# Patient Record
Sex: FEMALE | Race: WHITE | Employment: OTHER | URBAN - METROPOLITAN AREA
[De-identification: names, ages, dates, MRNs, and addresses within clinical notes are randomized per-mention and may not be internally consistent; named-entity substitution may affect disease eponyms.]

---

## 2017-01-30 PROBLEM — Z95.2 AORTIC VALVE REPLACED: Status: ACTIVE | Noted: 2017-01-30

## 2017-01-30 PROBLEM — N18.4 TYPE 2 DIABETES MELLITUS WITH STAGE 4 CHRONIC KIDNEY DISEASE, WITHOUT LONG-TERM CURRENT USE OF INSULIN (HCC): Status: ACTIVE | Noted: 2017-01-30

## 2017-01-30 PROBLEM — E11.22 TYPE 2 DIABETES MELLITUS WITH STAGE 4 CHRONIC KIDNEY DISEASE, WITHOUT LONG-TERM CURRENT USE OF INSULIN (HCC): Status: ACTIVE | Noted: 2017-01-30

## 2017-01-30 PROBLEM — Z85.3 HX: BREAST CANCER: Status: ACTIVE | Noted: 2017-01-30

## 2017-02-02 PROBLEM — I12.9 RENAL HYPERTENSION, STAGE 1-4 OR UNSPECIFIED CHRONIC KIDNEY DISEASE: Status: ACTIVE | Noted: 2017-02-02

## 2017-03-09 PROBLEM — M48.02 CERVICAL SPINAL STENOSIS: Status: ACTIVE | Noted: 2017-03-09

## 2017-03-09 PROBLEM — R73.09 ABNORMAL GLUCOSE: Status: ACTIVE | Noted: 2017-03-09

## 2017-03-13 PROCEDURE — 81003 URINALYSIS AUTO W/O SCOPE: CPT | Performed by: INTERNAL MEDICINE

## 2017-03-14 ENCOUNTER — HOSPITAL ENCOUNTER (OUTPATIENT)
Dept: PHYSICAL THERAPY | Facility: HOSPITAL | Age: 82
Setting detail: THERAPIES SERIES
Discharge: HOME | End: 2017-03-14
Attending: ORTHOPAEDIC SURGERY
Payer: MEDICARE

## 2017-03-14 ENCOUNTER — APPOINTMENT (OUTPATIENT)
Dept: LAB | Facility: HOSPITAL | Age: 82
End: 2017-03-14
Attending: ORTHOPAEDIC SURGERY
Payer: MEDICARE

## 2017-03-14 DIAGNOSIS — M48.02 CERVICAL SPINAL STENOSIS: ICD-10-CM

## 2017-03-14 LAB
APTT PPP: 33.2 SECONDS (ref 25–34)
INR BLD: 1.02 (ref 0.89–1.11)
PSA SERPL DL<=0.01 NG/ML-MCNC: 13.4 SECONDS (ref 12–14.3)

## 2017-03-14 PROCEDURE — 85610 PROTHROMBIN TIME: CPT

## 2017-03-14 PROCEDURE — 87147 CULTURE TYPE IMMUNOLOGIC: CPT

## 2017-03-14 PROCEDURE — 36415 COLL VENOUS BLD VENIPUNCTURE: CPT

## 2017-03-14 PROCEDURE — 87081 CULTURE SCREEN ONLY: CPT

## 2017-03-14 PROCEDURE — 85730 THROMBOPLASTIN TIME PARTIAL: CPT

## 2017-03-20 ENCOUNTER — ANESTHESIA EVENT (OUTPATIENT)
Dept: SURGERY | Facility: HOSPITAL | Age: 82
End: 2017-03-20

## 2017-03-23 ENCOUNTER — ANESTHESIA (OUTPATIENT)
Dept: SURGERY | Facility: HOSPITAL | Age: 82
End: 2017-03-23

## 2017-04-03 ENCOUNTER — HOSPITAL ENCOUNTER (INPATIENT)
Facility: HOSPITAL | Age: 82
LOS: 2 days | Discharge: NURSING, REHAB OR LONG-TERM CARE | DRG: 472 | End: 2017-04-05
Attending: ORTHOPAEDIC SURGERY | Admitting: ORTHOPAEDIC SURGERY
Payer: MEDICARE

## 2017-04-03 ENCOUNTER — SURGERY (OUTPATIENT)
Age: 82
End: 2017-04-03

## 2017-04-03 ENCOUNTER — APPOINTMENT (OUTPATIENT)
Dept: GENERAL RADIOLOGY | Facility: HOSPITAL | Age: 82
DRG: 472 | End: 2017-04-03
Attending: ORTHOPAEDIC SURGERY
Payer: MEDICARE

## 2017-04-03 DIAGNOSIS — M48.02 CERVICAL SPINAL STENOSIS: Primary | ICD-10-CM

## 2017-04-03 DIAGNOSIS — M54.2 NECK PAIN: ICD-10-CM

## 2017-04-03 DIAGNOSIS — G95.9 CERVICAL MYELOPATHY (HCC): ICD-10-CM

## 2017-04-03 PROCEDURE — 00BT0ZZ EXCISION OF SPINAL MENINGES, OPEN APPROACH: ICD-10-PCS | Performed by: ORTHOPAEDIC SURGERY

## 2017-04-03 PROCEDURE — 0RG20A0 FUSION OF 2 OR MORE CERVICAL VERTEBRAL JOINTS WITH INTERBODY FUSION DEVICE, ANTERIOR APPROACH, ANTERIOR COLUMN, OPEN APPROACH: ICD-10-PCS | Performed by: ORTHOPAEDIC SURGERY

## 2017-04-03 PROCEDURE — 01N10ZZ RELEASE CERVICAL NERVE, OPEN APPROACH: ICD-10-PCS | Performed by: ORTHOPAEDIC SURGERY

## 2017-04-03 PROCEDURE — 95938 SOMATOSENSORY TESTING: CPT | Performed by: ORTHOPAEDIC SURGERY

## 2017-04-03 PROCEDURE — 4A11X4G MONITORING OF PERIPHERAL NERVOUS ELECTRICAL ACTIVITY, INTRAOPERATIVE, EXTERNAL APPROACH: ICD-10-PCS | Performed by: ORTHOPAEDIC SURGERY

## 2017-04-03 PROCEDURE — 88311 DECALCIFY TISSUE: CPT | Performed by: ORTHOPAEDIC SURGERY

## 2017-04-03 PROCEDURE — 88304 TISSUE EXAM BY PATHOLOGIST: CPT | Performed by: ORTHOPAEDIC SURGERY

## 2017-04-03 PROCEDURE — 85027 COMPLETE CBC AUTOMATED: CPT | Performed by: PHYSICIAN ASSISTANT

## 2017-04-03 PROCEDURE — 95940 IONM IN OPERATNG ROOM 15 MIN: CPT | Performed by: ORTHOPAEDIC SURGERY

## 2017-04-03 PROCEDURE — 95870 NDL EMG LMTD STD MUSC 1 XTR: CPT | Performed by: ORTHOPAEDIC SURGERY

## 2017-04-03 PROCEDURE — 95939 C MOTOR EVOKED UPR&LWR LIMBS: CPT | Performed by: ORTHOPAEDIC SURGERY

## 2017-04-03 PROCEDURE — 0RB30ZZ EXCISION OF CERVICAL VERTEBRAL DISC, OPEN APPROACH: ICD-10-PCS | Performed by: ORTHOPAEDIC SURGERY

## 2017-04-03 PROCEDURE — 76001 XR C-ARM FLUORO >1 HOUR  (CPT=76001): CPT

## 2017-04-03 DEVICE — FLOSEAL SEALENT STERILE 10ML: Type: IMPLANTABLE DEVICE | Site: NECK | Status: FUNCTIONAL

## 2017-04-03 DEVICE — OSTEOCEL PRO BONE MATRIX SM: Type: IMPLANTABLE DEVICE | Site: NECK | Status: FUNCTIONAL

## 2017-04-03 DEVICE — ARCHON PLATE 40MM 2-LEVEL: Type: IMPLANTABLE DEVICE | Site: NECK | Status: FUNCTIONAL

## 2017-04-03 RX ORDER — TIZANIDINE 2 MG/1
2 TABLET ORAL EVERY 8 HOURS PRN
Status: DISCONTINUED | OUTPATIENT
Start: 2017-04-03 | End: 2017-04-05

## 2017-04-03 RX ORDER — SENNOSIDES 8.6 MG
17.2 TABLET ORAL NIGHTLY
Status: DISCONTINUED | OUTPATIENT
Start: 2017-04-03 | End: 2017-04-05

## 2017-04-03 RX ORDER — ALLOPURINOL 300 MG/1
300 TABLET ORAL DAILY PRN
Status: DISCONTINUED | OUTPATIENT
Start: 2017-04-03 | End: 2017-04-05

## 2017-04-03 RX ORDER — FUROSEMIDE 40 MG/1
40 TABLET ORAL EVERY 12 HOURS
COMMUNITY
End: 2017-06-30

## 2017-04-03 RX ORDER — MEPERIDINE HYDROCHLORIDE 25 MG/ML
12.5 INJECTION INTRAMUSCULAR; INTRAVENOUS; SUBCUTANEOUS AS NEEDED
Status: DISCONTINUED | OUTPATIENT
Start: 2017-04-03 | End: 2017-04-03 | Stop reason: HOSPADM

## 2017-04-03 RX ORDER — TIZANIDINE 4 MG/1
4 TABLET ORAL EVERY 8 HOURS PRN
Status: ON HOLD | COMMUNITY
End: 2017-04-05

## 2017-04-03 RX ORDER — HYDROMORPHONE HYDROCHLORIDE 1 MG/ML
INJECTION, SOLUTION INTRAMUSCULAR; INTRAVENOUS; SUBCUTANEOUS
Status: COMPLETED
Start: 2017-04-03 | End: 2017-04-03

## 2017-04-03 RX ORDER — SODIUM CHLORIDE, SODIUM LACTATE, POTASSIUM CHLORIDE, CALCIUM CHLORIDE 600; 310; 30; 20 MG/100ML; MG/100ML; MG/100ML; MG/100ML
INJECTION, SOLUTION INTRAVENOUS CONTINUOUS
Status: DISCONTINUED | OUTPATIENT
Start: 2017-04-03 | End: 2017-04-05

## 2017-04-03 RX ORDER — HYDROMORPHONE HYDROCHLORIDE 1 MG/ML
0.4 INJECTION, SOLUTION INTRAMUSCULAR; INTRAVENOUS; SUBCUTANEOUS EVERY 30 MIN PRN
Status: DISCONTINUED | OUTPATIENT
Start: 2017-04-03 | End: 2017-04-05

## 2017-04-03 RX ORDER — ONDANSETRON 2 MG/ML
4 INJECTION INTRAMUSCULAR; INTRAVENOUS AS NEEDED
Status: DISCONTINUED | OUTPATIENT
Start: 2017-04-03 | End: 2017-04-03 | Stop reason: HOSPADM

## 2017-04-03 RX ORDER — FUROSEMIDE 40 MG/1
40 TABLET ORAL
Status: DISCONTINUED | OUTPATIENT
Start: 2017-04-03 | End: 2017-04-05

## 2017-04-03 RX ORDER — DIPHENHYDRAMINE HCL 25 MG
25 CAPSULE ORAL EVERY 4 HOURS PRN
Status: DISCONTINUED | OUTPATIENT
Start: 2017-04-03 | End: 2017-04-05

## 2017-04-03 RX ORDER — ATORVASTATIN CALCIUM 20 MG/1
20 TABLET, FILM COATED ORAL NIGHTLY
Status: DISCONTINUED | OUTPATIENT
Start: 2017-04-03 | End: 2017-04-05

## 2017-04-03 RX ORDER — HYDROCODONE BITARTRATE AND ACETAMINOPHEN 5; 325 MG/1; MG/1
2 TABLET ORAL EVERY 4 HOURS PRN
Status: DISCONTINUED | OUTPATIENT
Start: 2017-04-03 | End: 2017-04-04

## 2017-04-03 RX ORDER — SODIUM CHLORIDE 9 MG/ML
INJECTION, SOLUTION INTRAVENOUS CONTINUOUS
Status: DISCONTINUED | OUTPATIENT
Start: 2017-04-03 | End: 2017-04-05

## 2017-04-03 RX ORDER — ONDANSETRON 2 MG/ML
4 INJECTION INTRAMUSCULAR; INTRAVENOUS EVERY 4 HOURS PRN
Status: ACTIVE | OUTPATIENT
Start: 2017-04-03 | End: 2017-04-04

## 2017-04-03 RX ORDER — ACETAMINOPHEN 325 MG/1
650 TABLET ORAL EVERY 4 HOURS PRN
Status: DISCONTINUED | OUTPATIENT
Start: 2017-04-03 | End: 2017-04-05

## 2017-04-03 RX ORDER — DEXAMETHASONE SODIUM PHOSPHATE 4 MG/ML
8 VIAL (ML) INJECTION EVERY 8 HOURS
Status: DISCONTINUED | OUTPATIENT
Start: 2017-04-03 | End: 2017-04-03

## 2017-04-03 RX ORDER — MORPHINE SULFATE 4 MG/ML
4 INJECTION, SOLUTION INTRAMUSCULAR; INTRAVENOUS EVERY 2 HOUR PRN
Status: DISCONTINUED | OUTPATIENT
Start: 2017-04-03 | End: 2017-04-05

## 2017-04-03 RX ORDER — DOCUSATE SODIUM 100 MG/1
100 CAPSULE, LIQUID FILLED ORAL 2 TIMES DAILY
Status: DISCONTINUED | OUTPATIENT
Start: 2017-04-03 | End: 2017-04-05

## 2017-04-03 RX ORDER — POLYETHYLENE GLYCOL 3350 17 G/17G
17 POWDER, FOR SOLUTION ORAL DAILY PRN
Status: DISCONTINUED | OUTPATIENT
Start: 2017-04-03 | End: 2017-04-05

## 2017-04-03 RX ORDER — METOCLOPRAMIDE HYDROCHLORIDE 5 MG/ML
5 INJECTION INTRAMUSCULAR; INTRAVENOUS EVERY 6 HOURS PRN
Status: DISCONTINUED | OUTPATIENT
Start: 2017-04-03 | End: 2017-04-05

## 2017-04-03 RX ORDER — HYDROCODONE BITARTRATE AND ACETAMINOPHEN 5; 325 MG/1; MG/1
1 TABLET ORAL EVERY 4 HOURS PRN
Status: DISCONTINUED | OUTPATIENT
Start: 2017-04-03 | End: 2017-04-04

## 2017-04-03 RX ORDER — NALOXONE HYDROCHLORIDE 0.4 MG/ML
80 INJECTION, SOLUTION INTRAMUSCULAR; INTRAVENOUS; SUBCUTANEOUS AS NEEDED
Status: DISCONTINUED | OUTPATIENT
Start: 2017-04-03 | End: 2017-04-03 | Stop reason: HOSPADM

## 2017-04-03 RX ORDER — MIDAZOLAM HYDROCHLORIDE 1 MG/ML
INJECTION INTRAMUSCULAR; INTRAVENOUS
Status: COMPLETED
Start: 2017-04-03 | End: 2017-04-03

## 2017-04-03 RX ORDER — HYDROMORPHONE HYDROCHLORIDE 1 MG/ML
0.4 INJECTION, SOLUTION INTRAMUSCULAR; INTRAVENOUS; SUBCUTANEOUS EVERY 5 MIN PRN
Status: DISCONTINUED | OUTPATIENT
Start: 2017-04-03 | End: 2017-04-03 | Stop reason: HOSPADM

## 2017-04-03 RX ORDER — LEVOTHYROXINE SODIUM 0.07 MG/1
75 TABLET ORAL
Status: DISCONTINUED | OUTPATIENT
Start: 2017-04-04 | End: 2017-04-05

## 2017-04-03 RX ORDER — DIPHENHYDRAMINE HYDROCHLORIDE 50 MG/ML
12.5 INJECTION INTRAMUSCULAR; INTRAVENOUS AS NEEDED
Status: DISCONTINUED | OUTPATIENT
Start: 2017-04-03 | End: 2017-04-03 | Stop reason: HOSPADM

## 2017-04-03 RX ORDER — MORPHINE SULFATE 2 MG/ML
2 INJECTION, SOLUTION INTRAMUSCULAR; INTRAVENOUS EVERY 2 HOUR PRN
Status: DISCONTINUED | OUTPATIENT
Start: 2017-04-03 | End: 2017-04-05

## 2017-04-03 RX ORDER — MORPHINE SULFATE 2 MG/ML
1 INJECTION, SOLUTION INTRAMUSCULAR; INTRAVENOUS EVERY 2 HOUR PRN
Status: DISCONTINUED | OUTPATIENT
Start: 2017-04-03 | End: 2017-04-05

## 2017-04-03 RX ORDER — NALOXONE HYDROCHLORIDE 0.4 MG/ML
0.08 INJECTION, SOLUTION INTRAMUSCULAR; INTRAVENOUS; SUBCUTANEOUS
Status: DISCONTINUED | OUTPATIENT
Start: 2017-04-03 | End: 2017-04-05

## 2017-04-03 RX ORDER — DIPHENHYDRAMINE HYDROCHLORIDE 50 MG/ML
25 INJECTION INTRAMUSCULAR; INTRAVENOUS EVERY 4 HOURS PRN
Status: DISCONTINUED | OUTPATIENT
Start: 2017-04-03 | End: 2017-04-05

## 2017-04-03 RX ORDER — DIPHENHYDRAMINE HYDROCHLORIDE 50 MG/ML
12.5 INJECTION INTRAMUSCULAR; INTRAVENOUS EVERY 4 HOURS PRN
Status: DISCONTINUED | OUTPATIENT
Start: 2017-04-03 | End: 2017-04-05

## 2017-04-03 RX ORDER — ALLOPURINOL 300 MG/1
300 TABLET ORAL AS NEEDED
Status: ON HOLD | COMMUNITY
End: 2017-04-05

## 2017-04-03 RX ORDER — NALBUPHINE HCL 10 MG/ML
2.5 AMPUL (ML) INJECTION EVERY 4 HOURS PRN
Status: DISCONTINUED | OUTPATIENT
Start: 2017-04-03 | End: 2017-04-05

## 2017-04-03 RX ORDER — MEPERIDINE HYDROCHLORIDE 25 MG/ML
INJECTION INTRAMUSCULAR; INTRAVENOUS; SUBCUTANEOUS
Status: DISCONTINUED
Start: 2017-04-03 | End: 2017-04-03 | Stop reason: WASHOUT

## 2017-04-03 RX ORDER — ONDANSETRON 2 MG/ML
4 INJECTION INTRAMUSCULAR; INTRAVENOUS EVERY 6 HOURS PRN
Status: DISCONTINUED | OUTPATIENT
Start: 2017-04-04 | End: 2017-04-05

## 2017-04-03 RX ORDER — MIDAZOLAM HYDROCHLORIDE 1 MG/ML
1 INJECTION INTRAMUSCULAR; INTRAVENOUS EVERY 5 MIN PRN
Status: DISCONTINUED | OUTPATIENT
Start: 2017-04-03 | End: 2017-04-03 | Stop reason: HOSPADM

## 2017-04-03 RX ORDER — BACITRACIN 50000 [USP'U]/1
INJECTION, POWDER, LYOPHILIZED, FOR SOLUTION INTRAMUSCULAR AS NEEDED
Status: DISCONTINUED | OUTPATIENT
Start: 2017-04-03 | End: 2017-04-03 | Stop reason: HOSPADM

## 2017-04-03 RX ORDER — BISACODYL 10 MG
10 SUPPOSITORY, RECTAL RECTAL
Status: DISCONTINUED | OUTPATIENT
Start: 2017-04-03 | End: 2017-04-05

## 2017-04-03 RX ORDER — SODIUM PHOSPHATE, DIBASIC AND SODIUM PHOSPHATE, MONOBASIC 7; 19 G/133ML; G/133ML
1 ENEMA RECTAL ONCE AS NEEDED
Status: ACTIVE | OUTPATIENT
Start: 2017-04-03 | End: 2017-04-03

## 2017-04-03 RX ORDER — DEXAMETHASONE SODIUM PHOSPHATE 4 MG/ML
8 VIAL (ML) INJECTION EVERY 8 HOURS
Status: COMPLETED | OUTPATIENT
Start: 2017-04-03 | End: 2017-04-04

## 2017-04-03 NOTE — ADDENDUM NOTE
Addendum  created 04/03/17 1601 by Debi Alfaro MD    Modules edited: Clinical Notes    Clinical Notes:  File: [id number]

## 2017-04-03 NOTE — H&P
Bronsonjosselyn Yusuf is here for preoperative consultation through Dr. Latricia Dickinson She has cervical spinal stenosis.  She was found to be positive for MRSA, and she reports that Dr. Jamal Guzman staff mentions 3 consecutive tests.  I inquired of her was 3 areas at one time and sh spine–Surgery is planned  -     E4 ESTAB IV [id number]    MRSA (methicillin resistant staph aureus) culture positive–Will check with surgical office to see the procedure–They will proceed based upon current treatment and retest following surgery  -     E4 ESTAB deficiency     Colon cancer screening     Calcific tendinitis of right shoulder     Primary osteoarthritis of right shoulder     Mild intermittent asthma without complication     Primary osteoarthritis of first carpometacarpal joint of left hand     Parest

## 2017-04-03 NOTE — PROGRESS NOTES
S:   Denies difficulty breathing or swallowing  She denies numbness no arm pain    Inspection: Awake Alert  No acute distress. Blood pressure 165/83, pulse 81, temperature 98.3 °F (36.8 °C), temperature source Temporal, resp.  rate 14, height 5' 7\" (1.

## 2017-04-03 NOTE — PROGRESS NOTES
[facility] Pharmacy Note:  Renal Adjustment for Ancef (cefazolin)    Terrell Carrillo is a 80year old female who has been prescribed Ancef (cefazolin) 2 g IVPB every 8 hrs x 2 doses.   CrCl is 29.8 (using SCr of 1.44 from 3/9) so the dose has been adjusted to Anc

## 2017-04-03 NOTE — ANESTHESIA PREPROCEDURE EVALUATION
PRE-OP EVALUATION    Patient Name: Gabriel Mcpherson    Pre-op Diagnosis: CERVICAL 3 - CERVICAL 4, CERVICAL 4 - CERVICAL 5 STENOSIS, KYPHOSIS MYELOPAHTY    Procedure(s):  CERVICAL 3 - CERVICAL 4, CERVICAL 4 - CERVICAL 5 ANTERIOR CERVICAL DISCECTOMY FUSION Cardiovascular      ECG reviewed.             (+) hypertension   (+) hyperlipidemia  (+) CAD    (+) CABG/stent         (+) dysrhythmias and atrial fibrillation                  Endo/Other           (+) hypothyroidism                       Pulmonary      ( EPIDURAL;  Surgeon: Leeroy Bolaños MD;  Location: [facility] FOR PAIN MANAGEMENT    INJECTION, W/WO CONTRAST, DX/THERAPEUTIC SUBSTANCE, EPIDURAL/SUBARACHNOID; LUMBAR/SACRAL N/A 8/13/2014    Comment Procedure: LUMBAR EPIDURAL;  Surgeon: Leeroy Bolaños MD; pain management plan discussed with surgeon and patient. Plan/risks discussed with: patient                Options, risks and benefits of anesthesia as outlined in the anesthesia consent were reviewed with the patient.  The consent was signed without f

## 2017-04-03 NOTE — ANESTHESIA POSTPROCEDURE EVALUATION
[facility] Patient Status:  Surgery Admit   Age/Gender 80year old female MRN [id number]   [facility] SURGERY Attending Rachel Arizmendi MD   Hosp Day # 0 PCP Violeta Sparrow MD       Anesthesia Post-op Note    Proce

## 2017-04-03 NOTE — PROGRESS NOTES
Patient arrived on unit from PACU s/p ACDF C3-5 with Dr Mark Garcia. Patient drowsy, uncomfortable on arrival. Re-educated patient on PCA use, demonstrates understanding. Pain decreased from 10-6 when reassessed.    Weakness to bilateral arms, L>R when assessed, d

## 2017-04-03 NOTE — PROGRESS NOTES
[facility] Pharmacy Note:  Renal Dose Adjustment for Metoclopramide (REGLAN)    Sheba Hoang has been prescribed Metoclopramide (REGLAN) 10 mg every 6 hours as needed for nausea/vomiting.     CrCl is estimated to be 29.8 mL/min (using SCr of 1.44 from 3/9/2017

## 2017-04-03 NOTE — BRIEF OP NOTE
[address] [facility]  Brief Op Note     Terrell Carrillo Location: OR   [facility] [id number] MRN [id number]   Admission Date 4/3/2017 Operation Date 4/3/2017   Attending Physician Kory Haley MD Operating Physician SUMMER Ellis       Pre-Operative Lynette

## 2017-04-04 PROCEDURE — 97530 THERAPEUTIC ACTIVITIES: CPT

## 2017-04-04 PROCEDURE — 97165 OT EVAL LOW COMPLEX 30 MIN: CPT

## 2017-04-04 PROCEDURE — 97535 SELF CARE MNGMENT TRAINING: CPT

## 2017-04-04 PROCEDURE — 97162 PT EVAL MOD COMPLEX 30 MIN: CPT

## 2017-04-04 PROCEDURE — 85027 COMPLETE CBC AUTOMATED: CPT | Performed by: PHYSICIAN ASSISTANT

## 2017-04-04 RX ORDER — ACETAMINOPHEN AND CODEINE PHOSPHATE 300; 30 MG/1; MG/1
1 TABLET ORAL EVERY 4 HOURS PRN
Status: DISCONTINUED | OUTPATIENT
Start: 2017-04-04 | End: 2017-04-05

## 2017-04-04 RX ORDER — TRAMADOL HYDROCHLORIDE 50 MG/1
50 TABLET ORAL EVERY 6 HOURS PRN
Status: DISCONTINUED | OUTPATIENT
Start: 2017-04-04 | End: 2017-04-05

## 2017-04-04 NOTE — PAYOR COMM NOTE
Komal Amin     (MR # [id number])         Order    Admit to inpatient Once [ADT1] (Order [id number])         Order Questions      Question Answer Comment     Admitting Physician Maral William [facility]      Diagnosis Cervical spinal stenosis      Level of Care A

## 2017-04-04 NOTE — PHYSICAL THERAPY NOTE
PHYSICAL THERAPY EVALUATION - INPATIENT     Room Number: 375/375-A  Evaluation Date: 4/4/2017  Type of Evaluation: Initial  Physician Order: PT Eval and Treat    Presenting Problem: S/p C3-C4,C4-C5,ACDF on 04/03/17  Reason for Therapy: Mobility Dysfunc DX/THERAPEUTIC SUBSTANCE, EPIDURAL/SUBARACHNOID; LUMBAR/SACRAL N/A 6/6/2014    Comment Procedure: LUMBAR EPIDURAL;  Surgeon: Callum Joshi MD;  Location: [address] & Sybil Scott NDL/CATH SPI DX/THER Thomas Misha Kapil 84 N/A 6/6/2014    Commen past 2-3 months patient has been using RW & w/c when in community.  Has been using adaptive equipments for self care & has modified several ADLs & self care styles to be able to perform with her weaker left UE & LE.Babysits 16 months twins of grand daughter the patient currently need. ..   -   Moving to and from a bed to a chair (including a wheelchair)?: A Little   -   Need to walk in hospital room?: A Little   -   Climbing 3-5 steps with a railing?: Total       AM-PAC Score:  Raw Score: 16   PT Approx Degree re-educate;Stair training;Transfer training;Balance training  Rehab Potential : Good  Frequency (Obs): Daily  Number of Visits to Meet Established Goals: 5      CURRENT GOALS    Goal #1 Patient is able to demonstrate supine - sit EOB @ level: supervision

## 2017-04-04 NOTE — PLAN OF CARE
PAIN - ADULT    • Verbalizes/displays adequate comfort level or patient's stated pain goal Progressing        Patient/Family Goals    • Patient/Family Short Term Goal Progressing        SAFETY ADULT - FALL    • Free from fall injury Progressing        Laisha

## 2017-04-04 NOTE — CM/SW NOTE
04/04/17 1432   CM/SW Referral Data   Referral Source Physician   Reason for Referral Discharge planning   Informant Patient;Spouse  (granddaughter)   Pertinent Medical Hx   Primary Care Physician Name Dr Fadi Tavarez   Patient Info   Advanced directives?  Chantelle Al

## 2017-04-04 NOTE — PROGRESS NOTES
Pt was seen this afternoon and fitted with a [place] J cervical collar. The brace was needed for stabilization post-op and to help with any muscle strain.

## 2017-04-04 NOTE — PROGRESS NOTES
POD #1 spine newsletter and swallowing precautions provided to patient. Reviewed indications, side effects of pain medication/narcotics and constipation prevention.  Stressed importance of increased fluids/roughage in diet, continued use stool softeners jessica

## 2017-04-04 NOTE — CONSULTS
.  Reason for consult: periop mgmt    Consulted by: Dr. Rhett Gregory    PCP: Pablo Looney MD      History of Present Illness: Patient is a 80year old female with PMH sig for asthma, HTN, PAF, hypothyroidism who presented for ACDF for cervical stenosis with LUMBAR EPIDURAL;  Surgeon: Lennice Felty, MD;  Location: [address] NDL/CATH SPI DX/THER Thomas Misha Kapil 84 N/A 6/6/2014    Comment Procedure: LUMBAR EPIDURAL;  Surgeon: Lennice Felty, MD;  Location: [address] [place] [address] Disp: 1 Tube Rfl: 0   Acetaminophen (TYLENOL ARTHRITIS PAIN OR) Take by mouth as needed. Disp:  Rfl:    metoprolol Tartrate 25 MG Oral Tab Take 1 tablet (25 mg total) by mouth 2 (two) times daily.  Disp: 60 tablet Rfl: 5   Atorvastatin Calcium 20 MG Oral Ta Use: Yes    Comment: very rare        Fam Hx  Family History   Problem Relation Age of Onset   • Cancer Father      lung cancer       Review of Systems  Comprehensive ROS reviewed and negative except for what's stated above.   Including negative for chest p asthma, uncomplicated COMPARISON:  None. TECHNIQUE: PA and left lateral views of the chest. FINDINGS:  Heart size and pulmonary vascularity are normal. There are sternotomy sutures and mediastinal clips present with prosthetic heart valve.  There is elongat meds, had discussion with pt/granddaughter and will try ultram and/or tylenol #3 instead of norco to see how she does with this. · Pt/ot  · Bowel regimen  · On IV steroids    # CAD s/p CABG, severe AS s/p bio AVR    # PAF- post-op after cardiac surgery.  O

## 2017-04-04 NOTE — PROGRESS NOTES
S:   Denies difficulty breathing or swallowing  She does not have pure numbness but a paresthesia in her left hand. She is still with weakness in the UE. She needed full assist with getting OOB yesterday. Inspection: Awake Alert  No acute distress.

## 2017-04-04 NOTE — OCCUPATIONAL THERAPY NOTE
OCCUPATIONAL THERAPY EVALUATION - INPATIENT     Room Number: 375/375-A  Evaluation Date: 4/4/2017  Type of Evaluation: Initial  Presenting Problem: s/p C3-5 ACDF 4/3/17    Physician Order: Coty Sullivan Consult to Occupational Therapy  Reason for Therapy: ADL/IADL Dy Comment Procedure: HIP INJECTION (PAIN);   Surgeon: Viky Calderon MD;  Location: [facility] FOR PAIN MANAGEMENT    INJECTION, W/WO CONTRAST, DX/THERAPEUTIC SUBSTANCE, EPIDURAL/SUBARACHNOID; LUMBAR/SACRAL N/A 6/6/2014    Comment Procedure: LUMBAR EPIDURAL; None    Occupation/Status: Retired   Hand Dominance: Right  Drives: Yes  Patient Regularly Uses: Glasses    Prior Level of [place]: Patient reports until August 2016 she was completely independent; then she began using a cane for functiona shortness of breath    ACTIVITIES OF DAILY LIVING ASSESSMENT  AM-PAC ‘6-Clicks’ Inpatient Daily Activity Short Form  How much help from another person does the patient currently need…  -   Putting on and taking off regular lower body clothing?: A Lot  - session/findings; All patient questions and concerns addressed;SCDs in place    ASSESSMENT   Patient is a 80year old  female admitted 4/3/2017 for C3-C5 ACDF.  In this OT evaluation patient presents with the following impairments: increased pain, decreased

## 2017-04-04 NOTE — PAYOR COMM NOTE
Attending Physician: Eduard Miller MD    Review Type: ADMISSION   Reviewer: Radha Garcia       Date: April 4, 2017 - 10:21 AM  Payor: [address] [place] [address] Number: [id number]  Admit date: 4/3/2017  9:01 AM   Admitted from [facility] MG/ML injection 8 mg     Date Action Dose Route User    4/4/2017 0444 Given 8 mg Intravenous Zenon Riley, RN    4/3/2017 2010 Given 8 mg Intravenous Zenon Osvaldo, RN      furosemide (LASIX) tab 40 mg     Date Action Dose Route User    4/4/2017 8668 (none) Intravenous Riana Hicks, RN      TiZANidine HCl (ZANAFLEX) tab 2 mg     Date Action Dose Route User    4/3/2017 1851 Given 2 mg Oral Sari Rodriguez RN        RESULTS LAST 24HRS:  Labs Reviewed   CBC, PLATELET; NO DIFFERENTIAL - Abnormal; Nota

## 2017-04-05 VITALS
DIASTOLIC BLOOD PRESSURE: 62 MMHG | OXYGEN SATURATION: 96 % | HEART RATE: 80 BPM | SYSTOLIC BLOOD PRESSURE: 133 MMHG | WEIGHT: 171.94 LBS | HEIGHT: 67 IN | TEMPERATURE: 98 F | RESPIRATION RATE: 18 BRPM | BODY MASS INDEX: 26.99 KG/M2

## 2017-04-05 PROCEDURE — 97116 GAIT TRAINING THERAPY: CPT

## 2017-04-05 PROCEDURE — 97530 THERAPEUTIC ACTIVITIES: CPT

## 2017-04-05 PROCEDURE — 97535 SELF CARE MNGMENT TRAINING: CPT

## 2017-04-05 RX ORDER — ACETAMINOPHEN AND CODEINE PHOSPHATE 300; 30 MG/1; MG/1
1 TABLET ORAL EVERY 4 HOURS PRN
Qty: 60 TABLET | Refills: 0 | Status: SHIPPED | OUTPATIENT
Start: 2017-04-05 | End: 2017-06-30

## 2017-04-05 RX ORDER — TRAMADOL HYDROCHLORIDE 50 MG/1
50 TABLET ORAL EVERY 8 HOURS PRN
Qty: 40 TABLET | Refills: 0 | Status: SHIPPED | OUTPATIENT
Start: 2017-04-05 | End: 2017-09-07

## 2017-04-05 RX ORDER — TIZANIDINE 4 MG/1
4 TABLET ORAL EVERY 8 HOURS PRN
Qty: 30 TABLET | Refills: 0 | Status: SHIPPED | OUTPATIENT
Start: 2017-04-05 | End: 2017-06-30

## 2017-04-05 NOTE — PROGRESS NOTES
Discharge education reviewed at bedside with patient. Teach back done.  Questions solicited and answered

## 2017-04-05 NOTE — PAYOR COMM NOTE
Attending Physician: Chacha Vickers MD    Review Type: CONTINUED STAY  [facility]     Date: April 5, 2017 - 10:51 AM  Payor: [address] [place] [address] Number: [id number]  Admit date: 4/3/2017  9:01 AM        REVIEWER COM User    4/5/2017 0934 Given 1 tablet Oral Radha Grace RN    4/4/2017 8904 Given 1 tablet Oral Camilla Jon RN    4/4/2017 4912 Given 1 tablet Oral Herlinda Aguila RN      Atorvastatin Calcium (LIPITOR) tab 20 mg     Date Action Dose Route User

## 2017-04-05 NOTE — PROGRESS NOTES
[facility]  Hospitalist note    PCP: Shai Swan MD    Chief Complaint:  79 yo woman with ACDF    SUBJECTIVE:  Feels that throat may be more swollen today. However still able to eat. Voice stronger. No sob. Took ultram overnight which did not help.  Too 1047   • lactated ringers     • sodium chloride 83 mL/hr at 04/04/17 0149     Acetaminophen-Codeine #3, TraMADol HCl, HYDROmorphone HCl PF, diphenhydrAMINE **OR** DiphenhydrAMINE HCl, PEG 3350, magnesium hydroxide, bisacodyl, ondansetron HCl, Metoclopramid

## 2017-04-05 NOTE — PROGRESS NOTES
NURSING DISCHARGE NOTE    Discharged via ambulance  Accompanied by family  Belongings sent with the patient  A copy of discharge paper was given to the patient, all questions were answered, all needs were attended to.  Safety prec in place

## 2017-04-05 NOTE — CM/SW NOTE
Informed by RN during d/c rounds that pt is expected to d/c to [facility] today. TERESSA spoke with Thu at [facility] [phone]. Pt is accepted today, facility will have [facility] Medicare auth today. Discussed 4PM d/c time.     [facility] accepted trans

## 2017-04-05 NOTE — OCCUPATIONAL THERAPY NOTE
OCCUPATIONAL THERAPY TREATMENT NOTE - INPATIENT     Room Number: 375/375-A  Session: 1   Number of Visits to Meet Established Goals: 5    Presenting Problem: s/p C3-5 ACDF 4/3/17    History related to current admission: Patient with history of cervical spi MANAGEMENT    INJECTION, W/WO CONTRAST, DX/THERAPEUTIC SUBSTANCE, EPIDURAL/SUBARACHNOID; LUMBAR/SACRAL N/A 6/6/2014    Comment Procedure: LUMBAR EPIDURAL;  Surgeon: Sara White MD;  Location: [address] [place] [address] breath    ACTIVITIES OF DAILY LIVING ASSESSMENT  AM-PAC ‘6-Clicks’ Inpatient Daily Activity Short Form  How much help from another person does the patient currently need…  -   Putting on and taking off regular lower body clothing?: A Little  -   Bathing (i patient questions and concerns addressed    ASSESSMENT   Patient seen for OT services this pm. In this session patient making good progress towards OT goals with improvements in balance, endurance and independence in ADL tasks, however patient remains belo

## 2017-04-05 NOTE — PHYSICAL THERAPY NOTE
PHYSICAL THERAPY TREATMENT NOTE - INPATIENT    Room Number: 375/375-A     Session: 1   Number of Visits to Meet Established Goals: 5    Presenting Problem: S/p C3-C4,C4-C5,ACDF on 04/03/17    Problem List  Active Problems:    Cervical spinal stenosis Ash Lovelace NDL/CATH SPI DX/THER NJX N/A 6/6/2014    Comment Procedure: LUMBAR EPIDURAL;  Surgeon: Nigel Ortiz MD;  Location: [facility] FOR PAIN MANAGEMENT    INJECTION, W/WO CONTRAST, DX/THERAPEUTIC SUBSTANCE, EPIDURAL/SUBARACHNOID; LUMBAR/SACRAL N/A 7/1/ Fair           Static Standing: Poor +  Dynamic Standing: Poor +    ACTIVITY TOLERANCE  O2 Saturation: 94%  Room air    AM-PAC '6-Clicks' INPATIENT SHORT FORM - BASIC MOBILITY  How much difficulty does the patient currently have. ..  -   Turning over in bed increased difficulty maintaining  on RW. Pt returned to room - Pt performed seated ankle pumps x 20 reps for improved circulation. Pt with call light in reach. RN informed of session. Mobility Guidelines updated in Pt room for nursing staff.

## 2017-04-05 NOTE — PLAN OF CARE
DISCHARGE PLANNING    • Discharge to home or other facility with appropriate resources Progressing        Impaired Activities of Daily Living    • Achieve highest/safest level of independence in self care Progressing        PAIN - ADULT    • Verbalizes/dis

## 2017-04-05 NOTE — PROGRESS NOTES
S:   Denies difficulty breathing or swallowing  + numbness with paresthesia to the left hand. She has weakness to bilateral arms. She was not able to tolerate the brace, and would like to use the soft collar.   She states the Tramadol gives no relief, and

## 2017-04-05 NOTE — PROGRESS NOTES
1429 PM: Report called in to [facility]. Will be sending Rx for Tylenol 3, Zanaflex and Tramadol. Patient requested for extra soft cervical collar which RN ordered from CD and was given to the patient. Patient requested for her medical records.  Pa

## 2017-04-06 NOTE — CM/SW NOTE
04/06/17 0700   Discharge disposition   Discharged to: Skilled Nurs   Name of Facillity/Home [facility] [address]   Discharge transportation [facility] [birth year]

## 2017-04-07 NOTE — DISCHARGE SUMMARY
[facility]  Discharge Summary    [facility] Patient Status:  Inpatient    1935 MRN [id number]   [facility] 3SW-A Attending No att. providers found   Hosp Day # 2 PCP Denice Pepper MD     Date of Admission: 4/3/2017    Da & INST 2 LEVEL Shahbaz Lovelace MD [facility] MAIN OR Comp        Afterward, pt was brought to the PACU in stable condition. After the recovery room the patient was transferred to the floor using standard protocol orders.   Once on the floor the patient was followe Tablet 24 Hr  Take 1 tablet by mouth daily. , Normal, Disp-90 tablet, R-3      STOP taking these medications    allopurinol 300 MG Oral Tab    HYDROcodone-acetaminophen  MG Oral Tab    Acetaminophen (TYLENOL ARTHRITIS PAIN OR)    aspirin (ASPIRIN LOW

## 2017-04-24 NOTE — OPERATIVE REPORT
[facility]    PATIENT'S NAME: Deandre Zuniga   ATTENDING PHYSICIAN: Bird Hui M.D. OPERATING PHYSICIAN: Bird Hiu M.D.    PATIENT ACCOUNT#:   [de-identified]    LOCATION:  [address] [place] [address]  MEDICAL RECORD #:   [id number]       DATE OF BIRTH:  1 Fluoroscopy was wheeled in to [place] an oblique incision at the medial border of the left sternocleidomastoid muscle. The neck was sterilely prepped and draped. An incision was made with a #15 blade. Bovie electrocautery was used for hemostasis.   Francia Lucas undertaken. Undercutting technique was used to decompress behind the vertebral bodies. Endplates were prepared. Allograft cage was sized. A PEEK interbody cage was sized, allograft was placed inside it, and it was impacted into position.   Yaneth casper monitored. Motor-evoked potentials remained entirely intact when taken at regular intervals. A drain was applied and sewn in with 2-0 nylon. Fascia was reapproximated with figure-of-eight 2-0 Vicryl. A running 3-0 subcuticular stitch was applied.   Ster

## 2017-05-09 ENCOUNTER — APPOINTMENT (OUTPATIENT)
Dept: MRI IMAGING | Facility: HOSPITAL | Age: 82
DRG: 552 | End: 2017-05-09
Attending: PHYSICIAN ASSISTANT
Payer: MEDICARE

## 2017-05-09 ENCOUNTER — HOSPITAL ENCOUNTER (INPATIENT)
Facility: HOSPITAL | Age: 82
LOS: 2 days | Discharge: HOME | DRG: 552 | End: 2017-05-11
Attending: HOSPITALIST | Admitting: HOSPITALIST
Payer: MEDICARE

## 2017-05-09 DIAGNOSIS — M41.86 OTHER FORM OF SCOLIOSIS OF LUMBAR SPINE: Primary | ICD-10-CM

## 2017-05-09 DIAGNOSIS — M48.062 SPINAL STENOSIS, LUMBAR REGION, WITH NEUROGENIC CLAUDICATION: ICD-10-CM

## 2017-05-09 DIAGNOSIS — M21.371 RIGHT FOOT DROP: ICD-10-CM

## 2017-05-09 PROCEDURE — 87081 CULTURE SCREEN ONLY: CPT | Performed by: HOSPITALIST

## 2017-05-09 PROCEDURE — 85025 COMPLETE CBC W/AUTO DIFF WBC: CPT | Performed by: HOSPITALIST

## 2017-05-09 PROCEDURE — 80048 BASIC METABOLIC PNL TOTAL CA: CPT | Performed by: HOSPITALIST

## 2017-05-09 PROCEDURE — 85610 PROTHROMBIN TIME: CPT | Performed by: HOSPITALIST

## 2017-05-09 RX ORDER — TIZANIDINE 2 MG/1
4 TABLET ORAL EVERY 8 HOURS PRN
Status: DISCONTINUED | OUTPATIENT
Start: 2017-05-09 | End: 2017-05-11

## 2017-05-09 RX ORDER — LEVOTHYROXINE SODIUM 0.07 MG/1
75 TABLET ORAL
Status: DISCONTINUED | OUTPATIENT
Start: 2017-05-10 | End: 2017-05-11

## 2017-05-09 RX ORDER — ONDANSETRON 2 MG/ML
4 INJECTION INTRAMUSCULAR; INTRAVENOUS EVERY 6 HOURS PRN
Status: DISCONTINUED | OUTPATIENT
Start: 2017-05-09 | End: 2017-05-11

## 2017-05-09 RX ORDER — ATORVASTATIN CALCIUM 20 MG/1
20 TABLET, FILM COATED ORAL DAILY
Status: DISCONTINUED | OUTPATIENT
Start: 2017-05-10 | End: 2017-05-11

## 2017-05-09 RX ORDER — TRAMADOL HYDROCHLORIDE 50 MG/1
50 TABLET ORAL EVERY 8 HOURS PRN
Status: DISCONTINUED | OUTPATIENT
Start: 2017-05-09 | End: 2017-05-11

## 2017-05-09 RX ORDER — ACETAMINOPHEN AND CODEINE PHOSPHATE 300; 30 MG/1; MG/1
1-2 TABLET ORAL EVERY 4 HOURS PRN
Status: DISCONTINUED | OUTPATIENT
Start: 2017-05-09 | End: 2017-05-09 | Stop reason: SDUPTHER

## 2017-05-09 RX ORDER — ACETAMINOPHEN AND CODEINE PHOSPHATE 300; 30 MG/1; MG/1
2 TABLET ORAL EVERY 4 HOURS PRN
Status: DISCONTINUED | OUTPATIENT
Start: 2017-05-09 | End: 2017-05-11

## 2017-05-09 RX ORDER — FUROSEMIDE 40 MG/1
40 TABLET ORAL EVERY 12 HOURS
Status: DISCONTINUED | OUTPATIENT
Start: 2017-05-09 | End: 2017-05-11

## 2017-05-09 RX ORDER — ACETAMINOPHEN AND CODEINE PHOSPHATE 300; 30 MG/1; MG/1
1 TABLET ORAL EVERY 4 HOURS PRN
Status: DISCONTINUED | OUTPATIENT
Start: 2017-05-09 | End: 2017-05-11

## 2017-05-09 RX ORDER — DEXAMETHASONE SODIUM PHOSPHATE 4 MG/ML
10 VIAL (ML) INJECTION EVERY 8 HOURS
Status: COMPLETED | OUTPATIENT
Start: 2017-05-09 | End: 2017-05-10

## 2017-05-09 RX ORDER — POTASSIUM CHLORIDE 20 MEQ/1
40 TABLET, EXTENDED RELEASE ORAL EVERY 4 HOURS
Status: COMPLETED | OUTPATIENT
Start: 2017-05-09 | End: 2017-05-10

## 2017-05-09 NOTE — PROGRESS NOTES
NURSING ADMISSION NOTE      Patient admitted via Wheelchair  Oriented to room. Safety precautions initiated. Bed in low position. Call light in reach. Admission navigator completed.

## 2017-05-10 ENCOUNTER — ANESTHESIA (OUTPATIENT)
Dept: PERIOP | Facility: HOSPITAL | Age: 82
DRG: 552 | End: 2017-05-10
Payer: MEDICARE

## 2017-05-10 ENCOUNTER — ANESTHESIA EVENT (OUTPATIENT)
Dept: PERIOP | Facility: HOSPITAL | Age: 82
DRG: 552 | End: 2017-05-10
Payer: MEDICARE

## 2017-05-10 ENCOUNTER — APPOINTMENT (OUTPATIENT)
Dept: MRI IMAGING | Facility: HOSPITAL | Age: 82
DRG: 552 | End: 2017-05-10
Attending: PHYSICIAN ASSISTANT
Payer: MEDICARE

## 2017-05-10 PROCEDURE — 85025 COMPLETE CBC W/AUTO DIFF WBC: CPT | Performed by: HOSPITALIST

## 2017-05-10 PROCEDURE — A9575 INJ GADOTERATE MEGLUMI 0.1ML: HCPCS

## 2017-05-10 PROCEDURE — 87081 CULTURE SCREEN ONLY: CPT | Performed by: HOSPITALIST

## 2017-05-10 PROCEDURE — 72156 MRI NECK SPINE W/O & W/DYE: CPT | Performed by: PHYSICIAN ASSISTANT

## 2017-05-10 PROCEDURE — 72158 MRI LUMBAR SPINE W/O & W/DYE: CPT | Performed by: PHYSICIAN ASSISTANT

## 2017-05-10 PROCEDURE — 80048 BASIC METABOLIC PNL TOTAL CA: CPT | Performed by: HOSPITALIST

## 2017-05-10 PROCEDURE — B03BYZZ MAGNETIC RESONANCE IMAGING (MRI) OF SPINAL CORD USING OTHER CONTRAST: ICD-10-PCS | Performed by: RADIOLOGY

## 2017-05-10 PROCEDURE — 85610 PROTHROMBIN TIME: CPT | Performed by: HOSPITALIST

## 2017-05-10 PROCEDURE — 72157 MRI CHEST SPINE W/O & W/DYE: CPT | Performed by: PHYSICIAN ASSISTANT

## 2017-05-10 RX ORDER — SODIUM CHLORIDE, SODIUM LACTATE, POTASSIUM CHLORIDE, CALCIUM CHLORIDE 600; 310; 30; 20 MG/100ML; MG/100ML; MG/100ML; MG/100ML
INJECTION, SOLUTION INTRAVENOUS CONTINUOUS
Status: DISCONTINUED | OUTPATIENT
Start: 2017-05-10 | End: 2017-05-11

## 2017-05-10 RX ORDER — NALOXONE HYDROCHLORIDE 0.4 MG/ML
80 INJECTION, SOLUTION INTRAMUSCULAR; INTRAVENOUS; SUBCUTANEOUS AS NEEDED
Status: ACTIVE | OUTPATIENT
Start: 2017-05-10 | End: 2017-05-11

## 2017-05-10 RX ORDER — HYDROMORPHONE HYDROCHLORIDE 1 MG/ML
0.4 INJECTION, SOLUTION INTRAMUSCULAR; INTRAVENOUS; SUBCUTANEOUS EVERY 5 MIN PRN
Status: ACTIVE | OUTPATIENT
Start: 2017-05-10 | End: 2017-05-11

## 2017-05-10 NOTE — CM/SW NOTE
05/10/17 1000   CM/SW Referral Data   Referral Source    Reason for Referral Discharge planning   Informant Patient   Pertinent Medical Hx   Primary Care Physician Name Dr Lb Perez   Patient Info   Patient's Mental Status Alert;Oriented   Pa

## 2017-05-10 NOTE — PROGRESS NOTES
[facility] Hospitalist Progress Note     Anthony Vazquez Patient Status:  Inpatient    1935 MRN [id number]   [facility] [place] 3SW-A Attending Catalina Rollins MD   Hosp Day # 1 PCP Jacey Yepez MD     CC: follow up    SUBJECTIVE:  Pt down at

## 2017-05-10 NOTE — ANESTHESIA PREPROCEDURE EVALUATION
PRE-OP EVALUATION    Patient Name: Niki Nguyen    Pre-op Diagnosis: * No surgery found *    * No surgery found *    * Surgery not found *    Pre-op vitals reviewed.   Temp: 98.6 °F (37 °C)  Pulse: 84  Resp: 20  BP: 144/62 mmHg  SpO2: 98 %  There is no Evaluation    Patient summary reviewed. Anesthetic Complications           GI/Hepatic/Renal             (+) chronic renal disease and CRI                   Cardiovascular  Comment: CONCLUSIONS:  1.  Significantly decreased functional capacity for her age LUMBAR/SACRAL N/A 6/6/2014    Comment Procedure: LUMBAR EPIDURAL;  Surgeon: Shruthi Castillo MD;  Location: [place] [address] [facility] & Tabitha Garcia NDL/CATH SPI DX/THER Thomas Misha Kapli 84 N/A 6/6/2014    Comment Procedure: LUMBAR EPIDURAL;  Surgeon: Emiliano Lunsford 93.7 05/10/2017   MCV 89.7 03/09/2017   MCH 29.8 05/10/2017   MCH 27.7 03/09/2017   MCHC 31.8 05/10/2017   MCHC 30.9* 03/09/2017   RDW 16.1* 05/10/2017   RDW 15.4 03/09/2017   .0 05/10/2017    03/09/2017       Lab Results  Component Value Michel

## 2017-05-10 NOTE — PAYOR COMM NOTE
Attending Physician: June Patrick MD    Review Type: ADMISSION   Reviewer: Orestes Escamilla       Date: May 10, 2017 - 1:00 PM  Payor: [address] [place] [address] Number: [id number]  Admit date: 5/9/2017  5:31 PM   Admitted from [facility] CHOLECYSTECTOMY          Comment  galbladder removed      KNEE REPLACEMENT SURGERY          Comment  bilateral      HYSTERECTOMY          Comment  partial      DRAIN/INJECT LARGE JOINT/BURSA    11/1/2012      Comment  Procedure: HIP INJECTION (PAIN);  Surg MANAGEMENT      FLUOR ZION & Joaquina Perry NDL/CATH SPI DX/THER NJX  N/A  8/13/2014      Comment  Procedure: LUMBAR EPIDURAL;  Surgeon: Lora Guaman MD;  Location: [facility] FOR PAIN MANAGEMENT      OTHER SURGICAL HISTORY  Right  7/2/10      Comment  foot surg Problem  Relation  Age of Onset    •  Cancer  Father          lung cancer      Review of Systems  Comprehensive ROS reviewed and negative except for what's stated above.      OBJECTIVE:  /62 mmHg  Pulse 84  Temp(Src) 98.2 °F (36.8 °C)  Resp 20  SpO2 changes.  If there is clinical concern for acute on chronic ischemia further evaluation with diffusion weighted MR imaging of the brain is recommended. Small chronic appearing lacunar type infarcts are seen in the basal nuclei.  NO sulci or gray-white matte (cpt=72040)    5/9/2017  EXAM:  X-RAY OF CERVICAL SPINE POST OP  CLINICAL INFORMATION:  Patient is Post Op C3-5 fusion. FINDINGS:  AP and Lateral views of cervical spine were completed.  Instrumentation seen from C3-5.  No signs of hardware failure or osteo Emory Serra, RN      Potassium Chloride ER (K-DUR M20) CR tab 40 mEq     Date Action Dose Route User    5/10/2017 0629 Given 40 mEq Oral Emory Serra, RN    5/10/2017 0245 Given 40 mEq Oral Emory Serra, RN    5/9/2017 0793 Give

## 2017-05-10 NOTE — H&P
[facility] Hospitalist H&P       CC: No chief complaint on file.        PCP: Toney Hughes MD    History of Present Illness:  Patient is a 80year old female with PMH sig for JTN, asthma, pAF, hypothyroidism presenting with numbness/tingling sensation in L 11/1/2012    Comment Procedure: HIP INJECTION (PAIN); Surgeon: Wil Pompa MD;  Location: [facility] FOR PAIN MANAGEMENT    PATIENT [address] FOR IV ANTIBIOTIC SURGICAL SITE INFECTION PROPHYLAXIS.   11/1/2012    Comment Procedure: HIP REPLACEMENT Right     CARPAL TUNNEL RELEASE Left     Comment x 2    TOTAL KNEE REPLACEMENT      OTHER      NDSC ABLATION & RCNSTJ ATRIA LMTD W/O BYPASS          ALL:    Sulfa Antibiotics       Wheezing, Shortness of Breath     HOME MEDS    Outpatient Presc lesions  Lungs: CTA, good effort  CV: nl S1/S2  GI: soft/ND, NT, +BS  Ext: nonedematous b/ LE  Neuro: R foot unable to doriflex. L arm with inability to raise arm at shoulder or elbow more than a couple inches off her body.  Pt reporting sensory deficits ar posterior fossa: Cerebellum: Moderate diffuse cerebellar volume loss. Brainstem: Negative.  Other: Calvarium: Mild diffuse nonspecific sclerotic changes seen throughout the skull, which could relate to chronic renal disease and less likely the patient's his x-ray of cervical spine. See body of report for details. 5/9 cervical xrays independently reviewed: hardware noted.        ASSESSMENT / PLAN:     # Numbness and tingling of L arm  - MRI imaging per spinal surgery however pt does not feel comfortable un

## 2017-05-10 NOTE — PROGRESS NOTES
[facility] hospitalist note    Pt down for MRI at time of my eval. Will attempt to see again as able.     Please call with any questions or concerns    Leonides Ortiz MD  [facility] Hospitalist  Pager: [phone]

## 2017-05-10 NOTE — PLAN OF CARE
Unable to tolerate MRI. Order received for test with anesthesia, scheduled time 1500.  Potassium replacement, has had doses x2, third dose scheduled at 0645, repeat k level 4 hours after last dose, 1045 this am. Except for meds with sips of water, patient w

## 2017-05-10 NOTE — CONSULTS
[facility]  Report of Consultation    Rico Nathaniel Patient Status:  Inpatient    1935 MRN [id number]   [facility] 3SW-A Attending Carmen White, *   Hosp Day # 1 PCP Libia Banuelos MD     Reason for Consultation: (PAIN); Surgeon: Gonzalo Shabazz MD;  Location: [facility] FOR PAIN MANAGEMENT    FLUOROSCOPIC GUIDANCE NEEDLE PLACEMENT  11/1/2012    Comment Procedure: HIP INJECTION (PAIN);   Surgeon: Gonzalo Shabazz MD;  Location: [address] [place] [address] right     OTHER SURGICAL HISTORY  12/10/2015    Comment open heart surgery CABG x 2; AVR; ablation,maze    TOTAL HIP REPLACEMENT Right     CARPAL TUNNEL RELEASE Left     Comment x 2    TOTAL KNEE REPLACEMENT      OTHER      NDSC ABLATION & RCNSTJ ATRIA LMT bilateral patella and Achilles tendons  Clonus is negative to bilateral lower extremities  Calves soft and non tender bilaterally   Dorsal pulses 2+ bilateral  Sensation is fully intact  Smooth pain free ROM to bilateral Hips, Knees, Ankles with exception Essential hypertension     Familial hypercholesterolemia     Hypothyroidism     Vitamin D deficiency     Colon cancer screening     Calcific tendinitis of right shoulder     Primary osteoarthritis of right shoulder     Mild intermittent asthma without co

## 2017-05-10 NOTE — ANESTHESIA POSTPROCEDURE EVALUATION
[facility] Patient Status:  Inpatient   Age/Gender 80year old female MRN [id number]   [facility] 3SW-A Attending Mateus Denise MD   [facility] Day # 1 PCP Libia Banuelos MD       Anesthesia Post-op Note    * No procedu

## 2017-05-10 NOTE — PROGRESS NOTES
05/10/17 1336   Clinical Encounter Type   Visited With Patient   Routine Visit Introduction   Continue Visiting No   Patient's Supportive Strategies/Resources  intern Dominique provided emotional and spiritual support including active listening

## 2017-05-10 NOTE — PROGRESS NOTES
Spinal MRIs reviewed in detail and compared to previous MRIs. Cervical spine shows improvement in anterior compression with some resolution of myelomalacia. Distal compressive disease is multifocal, complex and significant but not grossly changed.     [address]

## 2017-05-11 VITALS
SYSTOLIC BLOOD PRESSURE: 116 MMHG | HEART RATE: 80 BPM | TEMPERATURE: 99 F | RESPIRATION RATE: 18 BRPM | DIASTOLIC BLOOD PRESSURE: 54 MMHG | OXYGEN SATURATION: 93 %

## 2017-05-11 PROCEDURE — 97530 THERAPEUTIC ACTIVITIES: CPT

## 2017-05-11 PROCEDURE — 80048 BASIC METABOLIC PNL TOTAL CA: CPT | Performed by: HOSPITALIST

## 2017-05-11 PROCEDURE — 97161 PT EVAL LOW COMPLEX 20 MIN: CPT

## 2017-05-11 PROCEDURE — 85025 COMPLETE CBC W/AUTO DIFF WBC: CPT | Performed by: HOSPITALIST

## 2017-05-11 NOTE — PROGRESS NOTES
Patient discharged to home this evening. Patient to follow up with Dr Vincent Anguiano in 1 week. Patient verbalizes understanding of all teaching. All belongings collected and sent with patient at this time.    Escorted via wheelchair to [place] area by transport to be rel

## 2017-05-11 NOTE — PHYSICAL THERAPY NOTE
PHYSICAL THERAPY QUICK EVALUATION - INPATIENT    Room Number: 922/018-V  Evaluation Date: 5/11/2017  Presenting Problem: s/p right foot drop and left arm numbness and tingling on 5/9/17  Physician Order: PT Eval and Treat    Problem List  Active Problems: [address] GID & [facility] NDL/CATH SPI DX/THER NJX N/A 6/6/2014    Comment Procedure: LUMBAR EPIDURAL;  Surgeon: Gisell Laguerre MD;  Location: [facility] FOR PAIN MANAGEMENT    INJECTION, W/WO CONTRAST, DX/THERAPEUTIC SUBSTANCE, EPI with spouse, spouse will be home to assist. Patient was independent with ADLs and self care. No recent falls reported. SUBJECTIVE  Patient states she is ready to go through PT for her weakness when she is discharged.      OBJECTIVE  Precautions: Spine;O None   -   Climbing 3-5 steps with a railing?: A Little       AM-PAC Score:  Raw Score: 23   PT Approx Degree of Impairment Score: 11.2%   Standardized Score (AM-PAC Scale): 56.93   CMS Modifier (G-Code): CI      FUNCTIONAL ABILITY STATUS  Gait Assessment nursing staff and mobility team.   PT Discharge Recommendations: Outpatient PT; Other (Comment); Home (OP PT; supervision from family)    PLAN  Patient has been evaluated and presents with no skilled Physical Therapy needs at this time.   Patient discharged f

## 2017-05-11 NOTE — PAYOR COMM NOTE
Sheba Hoang     (MR # [id number])         Order    NURSING SUPERVISOR DIR ADM Once Madison Theodore (Order [id number])         Order Questions      Question Answer Comment     Admitting Physician Tammy Schneider      Patient Class Inpatient      Level of

## 2017-05-11 NOTE — PROGRESS NOTES
S: no back pain or leg pain. She has no new weakness. She has her AFO. She states she still has her right foot drop. She has no loss of bowel or bladder. Patient has no saddle anesthesia. She has no numbness.   Patient was again very confident in her Aden Pompa and myself that she does no want to have any further surgery at this time. She states she is fine with living with the right foot drop and the AFO. We will see her back in the office next week to check on her. She will call with any changes.   Her

## 2017-05-11 NOTE — DISCHARGE SUMMARY
General Medicine Discharge Summary     Patient ID:  Jennie Skinner  80year old  9/9/1935    Admit date: 5/9/2017    Discharge date and time: 5/11/17    Attending Physician: Darren You MD     Primary PHYSICAL THERAPY    Radiology reports:    Ct Brain Or Head ([id number])    5/9/2017  CLINICAL INDICATION:80years-old Female with  Anesthesia of skin. Paresthesias of the LEFT arm and RIGHT foot. History of breast cancer.  COMPARISON: MR cervical spine, 2/3/2017 subcortical white matter changes. If there is clinical concern for acute on chronic ischemia further evaluation with diffusion weighted MR imaging of the brain is recommended. Small chronic appearing lacunar type infarcts are seen in the basal nuclei.  NO AND LUMBAR SPINE WITH AND WITHOUT CONTRAST  COMPARISON:  None.   INDICATIONS:  New numbness/tingling to extremities, right foot drop  TECHNIQUE:  A comprehensive examination was performed utilizing a variety of imaging planes and imaging parameters to optim spinal canal stenosis. The thecal sac measures 8.5 mm. Moderate to severe bilateral foraminal narrowing, left greater than right. C4-C5: Moderate facet and uncinate hypertrophy. Moderate spinal canal stenosis. The thecal sac measures 7 mm in AP dimension. stenosis. Mild left and moderate right foraminal narrowing. T7-T8: Small central disc protrusion. Facet hypertrophy. Severe left and mild right foraminal narrowing. No spinal canal stenosis.   T8-T9: Mild to moderate central disc protrusion facet and ligam moderate ligamentum flavum thickening. Moderate facet hypertrophy. Mild to moderate spinal canal stenosis. Bilateral subarticular zone narrowing. Severe left and mild right foraminal narrowing.   L2-L3: Mild to moderate posterior disc osteophyte complex wit severe right foraminal narrowing, and mild to moderate left foraminal narrowing at L4-L5. 4. There are a few punctate foci of T2 hyperintense signal within the cervical cord at the level of C4 and C5 which likely represents myelomalacia.  5. T2/FLAIR hyperi

## 2017-06-30 PROBLEM — I35.0 AORTIC VALVE STENOSIS, NONRHEUMATIC: Status: ACTIVE | Noted: 2017-06-30

## 2017-06-30 PROBLEM — I25.810 CORONARY ARTERY DISEASE INVOLVING CORONARY BYPASS GRAFT OF NATIVE HEART WITHOUT ANGINA PECTORIS: Status: ACTIVE | Noted: 2017-06-30

## 2017-07-21 PROBLEM — M48.02 MYELOPATHY CONCURRENT WITH AND DUE TO SPINAL STENOSIS OF CERVICAL REGION (HCC): Status: ACTIVE | Noted: 2017-03-09

## 2017-07-21 PROBLEM — G99.2 MYELOPATHY CONCURRENT WITH AND DUE TO SPINAL STENOSIS OF CERVICAL REGION (HCC): Status: ACTIVE | Noted: 2017-03-09

## 2017-07-21 PROBLEM — Z95.1 HX OF CABG: Status: ACTIVE | Noted: 2017-07-21

## 2017-07-21 PROBLEM — M21.371 FOOT DROP, RIGHT: Status: ACTIVE | Noted: 2017-07-21

## 2017-09-13 PROBLEM — M25.552 PAIN OF LEFT HIP JOINT: Status: ACTIVE | Noted: 2017-09-13

## 2017-09-15 ENCOUNTER — HOSPITAL ENCOUNTER (EMERGENCY)
Facility: HOSPITAL | Age: 82
Discharge: HOME | End: 2017-09-15
Attending: EMERGENCY MEDICINE
Payer: MEDICARE

## 2017-09-15 VITALS
OXYGEN SATURATION: 98 % | RESPIRATION RATE: 16 BRPM | SYSTOLIC BLOOD PRESSURE: 148 MMHG | TEMPERATURE: 99 F | DIASTOLIC BLOOD PRESSURE: 76 MMHG | HEART RATE: 86 BPM

## 2017-09-15 DIAGNOSIS — S32.592A PUBIC RAMUS FRACTURE, LEFT, CLOSED, INITIAL ENCOUNTER (HCC): Primary | ICD-10-CM

## 2017-09-15 PROCEDURE — 99284 EMERGENCY DEPT VISIT MOD MDM: CPT

## 2017-09-15 RX ORDER — TRAMADOL HYDROCHLORIDE 50 MG/1
50 TABLET ORAL EVERY 6 HOURS PRN
Qty: 20 TABLET | Refills: 0 | Status: SHIPPED | OUTPATIENT
Start: 2017-09-15 | End: 2017-11-15

## 2017-09-15 RX ORDER — TRAMADOL HYDROCHLORIDE 50 MG/1
50 TABLET ORAL ONCE
Status: COMPLETED | OUTPATIENT
Start: 2017-09-15 | End: 2017-09-15

## 2017-09-15 RX ORDER — HYDROCODONE BITARTRATE AND ACETAMINOPHEN 5; 325 MG/1; MG/1
1 TABLET ORAL EVERY 6 HOURS PRN
Qty: 20 TABLET | Refills: 0 | Status: SHIPPED | OUTPATIENT
Start: 2017-09-15 | End: 2017-09-25

## 2017-09-16 NOTE — ED INITIAL ASSESSMENT (HPI)
Pt to ED from home after receiving results of pelvic fracture found on MRI. Pt states was instructed to come to ED for admission and that Dr. Luigi Amin would do surgery tomorrow. Pt states fell 1 week ago and has had worsening pain with standing/ambulation.

## 2017-09-16 NOTE — ED PROVIDER NOTES
Patient Seen in: [facility] Emergency Department    History   Patient presents with:  Abnormal Result (metabolic, cardiac)    Stated Complaint: mri= FRACTURED PELVIS    HPI    Patient is a pleasant 51-year-old female, presenting for evaluation of le Surgeon: Eileen Gandhi MD;  Location: [address] [place] [address] [place]                MANAGEMENT  11/1/2012: FLUOROSCOPIC GUIDANCE NEEDLE PLACEMENT      Comment: Procedure: HIP INJECTION (PAIN);   Surgeon:                Gisell Laguerre MD;  Location:  [facility]      lung cancer       Smoking status: Former Smoker                                                              Packs/day: 0.00      Years: 20.00        Quit date: 3/10/1997  Smokeless tobacco: Never Used                      Comment: smoked 5-6 c HIP WITHOUT CONTRAST CLINICAL HISTORY: Primary osteoarthritis of left hip. Pain of left hip joint. History of fall 2 weeks ago. Evaluate for fracture.  TECHNIQUE: Multiplanar multisequence MR images of the left hip were obtained without contrast. Some of th contusion and/or reactive edema related to the adjacent left pubic rami fractures. Fluid is seen in the soft tissues of the lateral margin of the left greater trochanter, suggestive of greater trochanteric bursitis.  Limited evaluation of the visualized low follow-up emphasized. Patient was invited to return for reevaluation of any problems. Patient was subsequently discharged without incident.     Disposition and Plan     Clinical Impression:  Pubic ramus fracture, left, closed, initial encounter ([facility] [address]  (

## 2017-10-17 PROBLEM — M48.07 SPINAL STENOSIS OF LUMBOSACRAL REGION: Status: ACTIVE | Noted: 2017-10-17

## 2017-10-17 PROBLEM — R29.898 LEFT HAND WEAKNESS: Status: ACTIVE | Noted: 2017-10-17

## 2018-01-17 PROBLEM — I77.1 TORTUOUS AORTA (HCC): Status: ACTIVE | Noted: 2018-01-17

## 2018-01-17 PROBLEM — I77.9 MILD CAROTID ARTERY DISEASE (HCC): Status: ACTIVE | Noted: 2018-01-17

## 2018-01-26 PROBLEM — N32.81 OAB (OVERACTIVE BLADDER): Status: ACTIVE | Noted: 2018-01-26

## 2018-02-16 PROBLEM — I35.0 NONRHEUMATIC AORTIC (VALVE) STENOSIS: Status: ACTIVE | Noted: 2018-02-16

## 2018-03-30 PROCEDURE — 83721 ASSAY OF BLOOD LIPOPROTEIN: CPT | Performed by: INTERNAL MEDICINE

## 2018-06-13 PROBLEM — R26.9 GAIT ABNORMALITY: Status: ACTIVE | Noted: 2018-06-13

## 2018-06-16 PROBLEM — R26.9 GAIT ABNORMALITY: Status: ACTIVE | Noted: 2018-06-16

## 2018-09-12 PROBLEM — N18.30 CKD (CHRONIC KIDNEY DISEASE) STAGE 3, GFR 30-59 ML/MIN (HCC): Status: ACTIVE | Noted: 2018-09-12

## 2018-10-23 PROBLEM — N18.30 STAGE 3 CHRONIC KIDNEY DISEASE (HCC): Status: ACTIVE | Noted: 2018-09-12

## 2019-03-13 PROBLEM — I35.0 NONRHEUMATIC AORTIC (VALVE) STENOSIS: Status: RESOLVED | Noted: 2018-02-16 | Resolved: 2019-03-13

## 2019-08-04 ENCOUNTER — APPOINTMENT (OUTPATIENT)
Dept: CT IMAGING | Age: 84
End: 2019-08-04
Attending: PHYSICIAN ASSISTANT
Payer: COMMERCIAL

## 2019-08-04 ENCOUNTER — HOSPITAL ENCOUNTER (EMERGENCY)
Age: 84
Discharge: HOME | End: 2019-08-04
Attending: EMERGENCY MEDICINE
Payer: COMMERCIAL

## 2019-08-04 VITALS
HEIGHT: 66 IN | DIASTOLIC BLOOD PRESSURE: 71 MMHG | BODY MASS INDEX: 26.52 KG/M2 | TEMPERATURE: 98 F | WEIGHT: 165 LBS | RESPIRATION RATE: 16 BRPM | SYSTOLIC BLOOD PRESSURE: 156 MMHG | OXYGEN SATURATION: 99 % | HEART RATE: 67 BPM

## 2019-08-04 DIAGNOSIS — M54.12 CERVICAL RADICULOPATHY: ICD-10-CM
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
## 2019-08-04 DIAGNOSIS — S16.1XXA STRAIN OF NECK MUSCLE, INITIAL ENCOUNTER: ICD-10-CM

## 2019-08-04 DIAGNOSIS — M19.90 OSTEOARTHRITIS, UNSPECIFIED OSTEOARTHRITIS TYPE, UNSPECIFIED SITE: ICD-10-CM

## 2019-08-04 PROCEDURE — 70450 CT HEAD/BRAIN W/O DYE: CPT | Performed by: PHYSICIAN ASSISTANT

## 2019-08-04 PROCEDURE — 99284 EMERGENCY DEPT VISIT MOD MDM: CPT

## 2019-08-04 PROCEDURE — 72125 CT NECK SPINE W/O DYE: CPT | Performed by: PHYSICIAN ASSISTANT

## 2019-08-04 NOTE — ED PROVIDER NOTES
I reviewed that chart and discussed the case. I have examined the patient and noted lungs clear to auscultation bilaterally cardiovascular exam shows rate rhythm abdomen soft nontender.       I agree with the following clinical impression(s):  No diagnosis
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
## 2019-09-23 ENCOUNTER — EXTERNAL RECORD (OUTPATIENT)
Dept: HEALTH INFORMATION MANAGEMENT | Facility: OTHER | Age: 84
End: 2019-09-23

## 2020-01-01 ENCOUNTER — HOSPITAL ENCOUNTER (EMERGENCY)
Facility: HOSPITAL | Age: 85
Discharge: HOME | End: 2020-01-01
Attending: EMERGENCY MEDICINE
Payer: MEDICARE

## 2020-01-01 ENCOUNTER — APPOINTMENT (OUTPATIENT)
Dept: GENERAL RADIOLOGY | Facility: HOSPITAL | Age: 85
End: 2020-01-01
Attending: EMERGENCY MEDICINE
Payer: MEDICARE

## 2020-01-01 VITALS
HEIGHT: 65 IN | SYSTOLIC BLOOD PRESSURE: 162 MMHG | HEART RATE: 78 BPM | TEMPERATURE: 98 F | OXYGEN SATURATION: 97 % | DIASTOLIC BLOOD PRESSURE: 72 MMHG | WEIGHT: 170 LBS | BODY MASS INDEX: 28.32 KG/M2 | RESPIRATION RATE: 20 BRPM

## 2020-01-01 DIAGNOSIS — K25.9 GASTRIC ULCER, UNSPECIFIED CHRONICITY, UNSPECIFIED WHETHER GASTRIC ULCER HEMORRHAGE OR PERFORATION PRESENT: Primary | ICD-10-CM

## 2020-01-01 LAB
ALBUMIN SERPL-MCNC: 3.4 G/DL (ref 3.4–5)
ALBUMIN/GLOB SERPL: 0.9 {RATIO} (ref 1–2)
ALP LIVER SERPL-CCNC: 102 U/L (ref 55–142)
ALT SERPL-CCNC: 13 U/L (ref 13–56)
ANION GAP SERPL CALC-SCNC: 5 MMOL/L (ref 0–18)
APTT PPP: 31.8 SECONDS (ref 25.4–36.1)
AST SERPL-CCNC: 16 U/L (ref 15–37)
ATRIAL RATE: 64 BPM
BASOPHILS # BLD AUTO: 0.06 X10(3) UL (ref 0–0.2)
BASOPHILS NFR BLD AUTO: 1 %
BILIRUB SERPL-MCNC: 0.4 MG/DL (ref 0.1–2)
BILIRUB UR QL STRIP.AUTO: NEGATIVE
BUN BLD-MCNC: 37 MG/DL (ref 7–18)
BUN/CREAT SERPL: 21.8 (ref 10–20)
CALCIUM BLD-MCNC: 9.6 MG/DL (ref 8.5–10.1)
CHLORIDE SERPL-SCNC: 108 MMOL/L (ref 98–112)
CLARITY UR REFRACT.AUTO: CLEAR
CO2 SERPL-SCNC: 26 MMOL/L (ref 21–32)
COLOR UR AUTO: COLORLESS
CREAT BLD-MCNC: 1.7 MG/DL (ref 0.55–1.02)
DEPRECATED RDW RBC AUTO: 57.6 FL (ref 35.1–46.3)
EOSINOPHIL # BLD AUTO: 0.71 X10(3) UL (ref 0–0.7)
EOSINOPHIL NFR BLD AUTO: 12 %
ERYTHROCYTE [DISTWIDTH] IN BLOOD BY AUTOMATED COUNT: 18.2 % (ref 11–15)
GLOBULIN PLAS-MCNC: 3.7 G/DL (ref 2.8–4.4)
GLUCOSE BLD-MCNC: 90 MG/DL (ref 70–99)
GLUCOSE UR STRIP.AUTO-MCNC: NEGATIVE MG/DL
HCT VFR BLD AUTO: 27.5 % (ref 35–48)
HGB BLD-MCNC: 8.6 G/DL (ref 12–16)
IMM GRANULOCYTES # BLD AUTO: 0.03 X10(3) UL (ref 0–1)
IMM GRANULOCYTES NFR BLD: 0.5 %
INR BLD: 1.1 (ref 0.89–1.11)
KETONES UR STRIP.AUTO-MCNC: NEGATIVE MG/DL
LIPASE SERPL-CCNC: 146 U/L (ref 73–393)
LYMPHOCYTES # BLD AUTO: 1.2 X10(3) UL (ref 1–4)
LYMPHOCYTES NFR BLD AUTO: 20.3 %
M PROTEIN MFR SERPL ELPH: 7.1 G/DL (ref 6.4–8.2)
MCH RBC QN AUTO: 27.3 PG (ref 26–34)
MCHC RBC AUTO-ENTMCNC: 31.3 G/DL (ref 31–37)
MCV RBC AUTO: 87.3 FL (ref 80–100)
MONOCYTES # BLD AUTO: 0.78 X10(3) UL (ref 0.1–1)
MONOCYTES NFR BLD AUTO: 13.2 %
NEUTROPHILS # BLD AUTO: 3.12 X10 (3) UL (ref 1.5–7.7)
NEUTROPHILS # BLD AUTO: 3.12 X10(3) UL (ref 1.5–7.7)
NEUTROPHILS NFR BLD AUTO: 53 %
NITRITE UR QL STRIP.AUTO: NEGATIVE
OSMOLALITY SERPL CALC.SUM OF ELEC: 296 MOSM/KG (ref 275–295)
P AXIS: 68 DEGREES
P-R INTERVAL: 152 MS
PH UR STRIP.AUTO: 6 [PH] (ref 4.5–8)
PLATELET # BLD AUTO: 252 10(3)UL (ref 150–450)
POTASSIUM SERPL-SCNC: 3.4 MMOL/L (ref 3.5–5.1)
PROT UR STRIP.AUTO-MCNC: NEGATIVE MG/DL
PSA SERPL DL<=0.01 NG/ML-MCNC: 14.5 SECONDS (ref 12.4–14.6)
Q-T INTERVAL: 444 MS
QRS DURATION: 106 MS
QTC CALCULATION (BEZET): 458 MS
R AXIS: -4 DEGREES
RBC # BLD AUTO: 3.15 X10(6)UL (ref 3.8–5.3)
RBC UR QL AUTO: NEGATIVE
SODIUM SERPL-SCNC: 139 MMOL/L (ref 136–145)
SP GR UR STRIP.AUTO: 1 (ref 1–1.03)
T AXIS: 107 DEGREES
UROBILINOGEN UR STRIP.AUTO-MCNC: <2 MG/DL
VENTRICULAR RATE: 64 BPM
WBC # BLD AUTO: 5.9 X10(3) UL (ref 4–11)

## 2020-01-01 PROCEDURE — 85025 COMPLETE CBC W/AUTO DIFF WBC: CPT | Performed by: EMERGENCY MEDICINE

## 2020-01-01 PROCEDURE — 99285 EMERGENCY DEPT VISIT HI MDM: CPT

## 2020-01-01 PROCEDURE — 85610 PROTHROMBIN TIME: CPT | Performed by: EMERGENCY MEDICINE

## 2020-01-01 PROCEDURE — 93005 ELECTROCARDIOGRAM TRACING: CPT

## 2020-01-01 PROCEDURE — 99284 EMERGENCY DEPT VISIT MOD MDM: CPT

## 2020-01-01 PROCEDURE — 93010 ELECTROCARDIOGRAM REPORT: CPT

## 2020-01-01 PROCEDURE — 71045 X-RAY EXAM CHEST 1 VIEW: CPT | Performed by: EMERGENCY MEDICINE

## 2020-01-01 PROCEDURE — 36415 COLL VENOUS BLD VENIPUNCTURE: CPT

## 2020-01-01 PROCEDURE — 82272 OCCULT BLD FECES 1-3 TESTS: CPT

## 2020-01-01 PROCEDURE — 85730 THROMBOPLASTIN TIME PARTIAL: CPT | Performed by: EMERGENCY MEDICINE

## 2020-01-01 PROCEDURE — 80053 COMPREHEN METABOLIC PANEL: CPT | Performed by: EMERGENCY MEDICINE

## 2020-01-01 PROCEDURE — 83690 ASSAY OF LIPASE: CPT | Performed by: EMERGENCY MEDICINE

## 2020-01-01 PROCEDURE — 81001 URINALYSIS AUTO W/SCOPE: CPT | Performed by: EMERGENCY MEDICINE

## 2020-01-13 PROBLEM — Z87.39 H/O: GOUT: Status: ACTIVE | Noted: 2020-01-01

## 2020-01-13 PROBLEM — R26.9 GAIT ABNORMALITY: Status: RESOLVED | Noted: 2018-06-16 | Resolved: 2020-01-01

## 2020-02-19 PROBLEM — R60.0 LOCALIZED EDEMA: Status: ACTIVE | Noted: 2020-01-01

## 2020-07-14 PROBLEM — M21.371 FOOT DROP, RIGHT: Status: RESOLVED | Noted: 2017-07-21 | Resolved: 2020-01-01

## 2020-09-03 NOTE — ED PROVIDER NOTES
Patient Seen in: [facility] Emergency Department      History   Patient presents with:  GI Bleeding    Stated Complaint: gastric ulcers, recently hospitalized for same    HPI    Patient comes in with her daughter after passing a very large dark stoo KNEE REPLACEMENT SURGERY      bilateral   • LUMBAR EPIDURAL N/A 8/13/2014    Performed by Judie Chavira MD at [facility] 7/1/2014    Performed by Judie Chavira MD at [address]  auscultation bilaterally with equal breath sounds, no wheezing, no rhonchi   Abdomen: Positive bowel sounds,  soft, no tenderness, no distension, no masses, no organomegaly, no rebound, no guarding   Extremities: No cyanosis, no clubbing, no edema   Muscul previous               CXR -vascular prominence          MDM     Patient with recent admission after melena, found to have 3 gastric ulcers at [address] [place] [address], recent hemoglobin 7.4, now passes another large stool and felt she should come in to be checked.   H

## 2020-09-03 NOTE — ED INITIAL ASSESSMENT (HPI)
Patient was recently admitted at [facility] for low hgb and dark stools. Had scope and showed 3 ulcers, not bleeding at that time. Patient had not had BM in 9 days until this morning. Patients stool dark again, feels like she had low energy.

## 2020-09-04 NOTE — ED NOTES
This RN tried 3x for IV stick (patient allowed this RN to attempt a 3rd time.)  Another RN attempted US IV with a failed attempt. Dr. Melisa Day would like blood work only if IV is too difficult.  This RN was able to use butterfly with successful blood ret

## (undated) DEVICE — GLOVE SURG TRIUMPH SZ 7

## (undated) DEVICE — BANDAGE ROLL,100% COTTON, 6 PLY, LARGE: Brand: KERLIX

## (undated) DEVICE — SOL  .9 1000ML BTL

## (undated) DEVICE — #15 STERILE STAINLESS BLADE: Brand: STERILE STAINLESS BLADES

## (undated) DEVICE — DISTRACTION SCREW 12MM

## (undated) DEVICE — PAD SACRAL SPAN AID

## (undated) DEVICE — UNDYED BRAIDED (POLYGLACTIN 910), SYNTHETIC ABSORBABLE SUTURE: Brand: COATED VICRYL

## (undated) DEVICE — FLOSEAL SEALENT STERILE 10ML

## (undated) DEVICE — NVM5 NEEDLE MODULE M/E

## (undated) DEVICE — 3M™ MICROFOAM™ TAPE 1528-4: Brand: 3M™ MICROFOAM™

## (undated) DEVICE — CAUTERY BLADE 2IN INS E1455

## (undated) DEVICE — DRAPE TABLE COVER 44X90 TC-10

## (undated) DEVICE — SUTURE VICRYL 2-0 FSL

## (undated) DEVICE — LIGHT HANDLE

## (undated) DEVICE — GLOVE SURG TRIUMPH SZ 71/2

## (undated) DEVICE — 3.0MM PRECISION NEURO (MATCH HEAD)

## (undated) DEVICE — STRL PENROSE DRAIN 18" X 1/4": Brand: CARDINAL HEALTH

## (undated) DEVICE — DRILL SRG OIL CRTDG MAESTRO

## (undated) DEVICE — KENDALL SCD EXPRESS SLEEVES, THIGH LENGTH, MEDIUM: Brand: KENDALL SCD

## (undated) DEVICE — LAMINECTOMY CDS: Brand: MEDLINE INDUSTRIES, INC.

## (undated) DEVICE — TRANSPOSAL ULTRAFLEX DUO/QUAD ULTRA CART MANIFOLD

## (undated) DEVICE — MAXCESS C MODULE

## (undated) NOTE — IP AVS SNAPSHOT
[facility] [place] [address] [place], [address] ~ [phone]                Discharge Summary   5/9/2017    Ms.  [address] [facility]           Admission Information        Provider Department    5/9/2017 Mariza Massey MD  3sw-A Atorvastatin Calcium 20 MG Tabs   Last time this was given:  20 mg on 5/11/2017  8:29 AM   Commonly known as:  LIPITOR        Take 1 tablet (20 mg total) by mouth daily.     Ene Duncan                           furosemide 40 MG Tabs   Last time this w ? Turn your body as a unit especially if you have a hard collar. Don’t turn your head suddenly or extremely to look from side to side. Sitting  ? Sit with your knees at about the same level as your hips; use a footstool if needed. ?  Firm chairs with st ? Support your neck with one pillow keeping it in a neutral position  ? Sleep on back or side avoiding face down position      Diet and Constipation Prevention  ? Soft diet may help if you have discomfort while swallowing.   ? Cold drinks or food may feel m ? Schedule 2 weeks after surgery with surgeon as directed at discharge  ? Schedule one week follow up with Primary Care Physician. Review all medications. When to contact your surgeon  ? Temp > 101F; take your temperature twice a day.   ? Increased kourtney Your physician has referred you for Physical Therapy and/or Occupational Therapy affiliated with [place] [address]. The locations are listed below.  To schedule an appointment at any one of our locations, please call: [phone] (or [phone] from t locations listed above. It is important that you contact your health insurance directly to determine your therapy benefit coverage including co-payment, deductible, patient payment responsibility, or limit in number of visits.  Co-payment is due at the time Neutrophil % Lymphocyte % Monocyte % Eosinophil % Basophil % Prelim Neut Abs Final Neut Abs Lymphocyte Abso Monocyte Absolu Eosinophil Abso Basophil Absolu    (05/11/17)  89.4 (05/11/17)  9.0 (05/11/17)  0.9 (05/11/17)  0.0 (05/11/17)  0.1 -- (05/11/17) can help with your Affordable Care Act coverage, as well as all types of Medicaid plans. To get signed up and covered, please call [phone] and ask to get set up for an insurance coverage that is in-network with Pavel Silva Acetaminophen-Codeine #3 300-30 MG Oral Tab    TraMADol HCl (ULTRAM) 50 MG Oral Tab       Use:  Treat pain   Most common side effects: Constipation, drowsiness, dizziness, urinary retention (inability to urinate)   What to report to your healthcare team:

## (undated) NOTE — LETTER
[facility]  Phone: [phone]  Right Fax: [phone]  [facility] By:  Xochilt Rey RN Date: 3/16/17    Patient Name: Vincent Nguyen  Surgery Date: 3/23/2017    CSN: [id number]  Medical Record: [id number]

## (undated) NOTE — IP AVS SNAPSHOT
Patient Demographics     Address Phone E-mail Address    [facility] [address] [phone] [facility])  [phone] (Mobile) [email or web address]      Emergency Contact(s)     Name Relation Home Work [address] Spouse [phone]- ? Wear as instructed; may remove when showering. ? Place collar back on after showering. ? Wear even when sleeping IF instructed to do so.  ? Exact time (weeks) of brace to be worn to be determined by surgeon; discuss at office visit  ?  Keep neck straigh ? Support your neck with one pillow keeping it in a neutral position  ? Sleep on back or side avoiding face down position      Diet and Constipation Prevention  ? Soft diet may help if you have discomfort while swallowing.   ? Cold drinks or food may feel m ? Schedule 2 weeks after surgery with surgeon as directed at discharge  ? Schedule one week follow up with Primary Care Physician. Review all medications. When to contact your surgeon  ? Temp > 101F; take your temperature twice a day.   ? Increased kourtney Next dose due:  4/6 AM        Take 1 tablet by mouth daily.     Bijal Mcintyre                           Fluticasone Propionate  MCG/ACT Aero   Commonly known as:  FLOVENT HFA   Next dose due:  4/5 PM        Inhale 1 puff into the lungs 2 (t Order ID Medication Name Action Time Action Reason Comments    [id number] Acetaminophen-Codeine #3 (TYLENOL #3) 300-30 MG tab 1 tablet 04/04/17 1838 Given      [id number] Acetaminophen-Codeine #3 (TYLENOL #3) 300-30 MG tab 1 tablet 04/04/17 2353 Given Filed:  4/3/2017 11:32 AM Note Time:  4/3/2017 11:31 AM Status:  Signed    :  Onur Gordon MD (Physician)           Nilsa Ward is here for preoperative consultation through Dr. Eric Villegas She has cervical spinal stenosis.  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 No sob. No dizziness. tolerating PO      PMH:  Past Medical History   Diagnosis Date   • Extrinsic asthma, unspecified    • Unspecified essential hypertension    • History of breast cancer      right   • High cholesterol EPIDURAL/SUBARACHNOID; LUMBAR/SACRAL N/A 7/1/2014    Comment Procedure: LUMBAR EPIDURAL;  Surgeon: June Lai MD;  Location: [address] [place] [address] NDL/CATH SPI DX/THER Thomas Cabral Kapil [address] N/A 7/1/2014    Comment Procedure: Yasmin Armstrong daily. Disp: 90 tablet Rfl: 3   aspirin (ASPIRIN LOW DOSE) 81 MG Oral Tab EC Take 81 mg by mouth daily. Disp:  Rfl:    Levothyroxine Sodium 75 MCG Oral Tab Take 1 tablet by mouth daily.  Disp:  Rfl:    TraMADol HCl (ULTRAM) 50 MG Oral Tab Take 1 tablet (50 OBJECTIVE:  /74 mmHg  Pulse 78  Temp(Src) 98.3 °F (36.8 °C) (Oral)  Resp 16  Ht 170.2 cm (5' 7\")  Wt 171 lb 15.3 oz (78 kg)  BMI 26.93 kg/m2  SpO2 96%  General:  Alert, no distress, appears stated age.    Head:  Normocephalic, without obvious abnorma descending thoracic aorta. There are no focal airspace infiltrates or consolidations to suggest ammonia. .    There is prominence of the right pulmonary emily likely related to prominent  pulmonary central vessels.  Suggest correlation with any previous outsi # PAF- post-op after cardiac surgery. On amiodarone in the past. Currently onBB      # HL/HTN  · BB, statin    # asthma- cont home inhalers, not active    Outpatient records reviewed confirming patient's medical history and medications.      Further recomme • Cataract      removed   • Visual impairment      reading glasses   • Disorder of thyroid        Past Surgical History      Past Surgical History    CHOLECYSTECTOMY      Comment galbladder removed    KNEE REPLACEMENT SURGERY      Comment bilateral    HYST Comment Procedure: LUMBAR EPIDURAL;  Surgeon: Gonzalo Shabazz MD;  Location: [address] [place] [address] NDL/CATH SPI DX/THER Thomas Misha [address] 84 N/A 8/13/2014    Comment Procedure: LUMBAR EPIDURAL;  Surgeon: Gonzalo Shabazz MD;  Location: [facility]   -   Need to walk in hospital room?: A Little   -   Climbing 3-5 steps with a railing?: Total       AM-PAC Score:  Raw Score: 16   PT Approx Degree of Impairment Score: 54.16%   Standardized Score (AM-PAC Scale): 40.78   CMS Modifier (G-Code): CK basic mobility is 54.16%, demonstrating most appropriate discharge recommendation to be JERONIMO. This score objectively validates current functional mobility and degree of impairment.    At this time, Pt. presents with decreased balance, impaired strength, dif Presenting Problem: S/p C3-C4,C4-C5,ACDF on 04/03/17  Reason for Therapy: Mobility Dysfunction and Discharge Planning    History related to current admission: Admitted S/p C3-C4,C4-C5,ACDF on 04/03/17  PMH as listed below.     Problem List  Active Problems: Location: [place] [address] GID & Jill Fitzpatrick NDL/CATH SPI DX/THER NJX N/A 6/6/2014    Comment Procedure: LUMBAR EPIDURAL;  Surgeon: Kehinde Tobin MD;  Location: 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 SUBJECTIVE  \"I wish this surgery will help me & get me stronger. \" Mike Elizalde -   Moving to and from a bed to a chair (including a wheelchair)?: A Little   -   Need to walk in hospital room?: A Little   -   Climbing 3-5 steps with a railing?: Total       AM-PAC Score:  Raw Score: 16   PT Approx Degree of Impairment Score: 54.16%   S training;Transfer training;Balance training  Rehab Potential : Good  Frequency (Obs): Daily  Number of Visits to Meet Established Goals: 5      CURRENT GOALS    Goal #1 Patient is able to demonstrate supine - sit EOB @ level: supervision     Goal #2 Jeannine Past Surgical History      Past Surgical History    CHOLECYSTECTOMY      Comment galbladder removed    KNEE REPLACEMENT SURGERY      Comment bilateral    HYSTERECTOMY      Comment partial    DRAIN/INJECT LARGE JOINT/BURSA  11/1/2012    Comment Procedure: H KALINA DONOVAN & Devoria Gurabo NDL/CATH SPI DX/THER NJX N/A 8/13/2014    Comment Procedure: LUMBAR EPIDURAL;  Surgeon: Nancy Gomez MD;  Location: [address] [place] [address]    OTHER SURGICAL HISTORY Right 7/2/10    Comment foot surgery    OTHER SURGICAL HISTO the following:  Left UE is weaker than right . Bilateral shoulder ROM limited after initial range.     Lower extremity ROM is within functional limits     Lower extremity strength is within functional limits except for the following:  Left LE weaker than Rig aware of session/findings; All patient questions and concerns addressed;SCDs in place; Discussed recommendations with /    ASSESSMENT     Patient is a 80year old female admitted 4/3/2017 for  S/p C3-C4,C4-C5,ACDF on 04/03/17.    In t :  Cindi Garcia OT (Occupational Therapist)           OCCUPATIONAL THERAPY EVALUATION - INPATIENT     Room Number: 375/375-A  Evaluation Date: 4/4/2017  Type of Evaluation: Initial  Presenting Problem: s/p C3-5 ACDF 4/3/17    Physician Order: PATIENT DOCUMENTED NOT TO HAVE EXPERIENCED ANY OF THE FOLLOWING EVENTS  11/1/2012    Comment Procedure: HIP INJECTION (PAIN);   Surgeon: Tai Garcia MD;  Location: [facility] FOR PAIN MANAGEMENT    INJECTION, W/WO CONTRAST, DX/THERAPEUTIC SUBSTANCE, EP Shower/Tub and Equipment: Walk-in shower; Shower chair  Other Equipment: None    Occupation/Status: Retired   Hand Dominance: Right  Drives: Yes  Patient Regularly Uses: Glasses    Prior Level of [place]: Patient reports until August 2016 s ACTIVITY TOLERANCE  O2 Saturation: 94%  Room air  No shortness of breath    ACTIVITIES OF DAILY LIVING ASSESSMENT  AM-PAC ‘6-Clicks’ Inpatient Daily Activity Short Form  How much help from another person does the patient currently need…  -   Put Patient End of Session: Up in chair;Needs met;Call light within reach;RN aware of session/findings; All patient questions and concerns addressed;SCDs in place    ASSESSMENT   Patient is a 80year old  female admitted 4/3/2017 for C3-C5 ACDF.  In this OT eval INFLUENZA 10/24/16     INFLUENZA 10/06/15     INFLUENZA 11/05/13     INFLUENZA 10/10/12     INFLUENZA 10/14/11     INFLUENZA 10/01/09     INFLUENZA 12/15/08     Influenza Virus Vaccine, H1N1 01/22/10     Kenalog Per 10mg Inj 03/07/16     Kenalog Per 10mg

## (undated) NOTE — ED AVS SNAPSHOT
João Roger   MRN: [id number]    Department:  [facility] Emergency Department   Date of Visit:  9/15/2017           Disclosure     Insurance plans vary and the physician(s) referred by the ER may not be covered by your plan.  Please contact yo If you have been prescribed any medication(s), please fill your prescription right away and begin taking the medication(s) as directed    If the emergency physician has read X-rays, these will be re-interpreted by a radiologist.  If there is a significant

## (undated) NOTE — IP AVS SNAPSHOT
[facility] [place] [address] [place] [address], [address] ~ [phone]                Discharge Summary   4/3/2017    Ms.  [address] [facility] [address]           Admission Information        Provider Department    4/3/2017 MASTER Bishop MD  [id number] Next dose due:  4/5 PM        Inhale 1 puff into the lungs 2 (two) times daily.     Wiley Anderson taking these medications        Instructions Authorizing Provider    Morning Afternoon Evening As Needed    A Commonly known as:  [address] OR                Where to Get Your Medications      Please  your prescriptions at the location directed by your doctor or nurse     Bring a paper prescription for each of these medications ? Exact time (weeks) of brace to be worn to be determined by surgeon; discuss at office visit  ? Keep neck straight while removing and replacing collar (No bending, turning or flexing neck with collar off)  ?  EXCEPTION: Do NOT remove collar for any reason Diet and Constipation Prevention  ? Soft diet may help if you have discomfort while swallowing. ? Cold drinks or food may feel more soothing. ? Drink six to eight glasses liquid (water, juice) per day. ? Eat a high fiber diet (bran, vegetables, fruit). When to contact your surgeon  ? Temp > 101F; take your temperature twice a day. ? Increased pain, swelling, redness, or any drainage to incision. ? Separation of incision. ? Sudden reappearance of arm pain that won’t go away with pain medication. ?  [address]   [phone]            May 03, 2017 10:40 AM   POSTOP with Shalom Fagan MD   [address] ([facility] [address])    [place] [address], [address]            Julien 0 26 (H) (03/09/17)  1.44 (H) (03/09/17)  10.2 (03/09/17)  127 (03/09/17)  20      Metabolic Lab Results  (Last result in the past 90 days)    ALT Bilirubin,Total Total Protein Albumin Sodium Potassium Chloride    (03/09/17)  15 (03/09/17)  0.36 (03/09/17) metoprolol Tartrate 25 MG Oral Tab         Use: Treat abnormal blood pressure (high or low), cardiac conditions; and/or abnormal heart rates/rhythms   Most common side effects: Dizziness or feeling lightheaded (especially with standing), heart rate change increased heart rate, increased blood pressure, allergic reaction, yeast infection of the mouth/tongue   What to report to your healthcare provider: Head or mouth pain, coating on mouth/tongue, shortness of breath, sensation of heart racing, rash, or itchi

## (undated) NOTE — ED AVS SNAPSHOT
Gabriel Mcpherson   MRN: [id number]    Department:  [facility] [place]   Date of Visit:  8/4/2019           Disclosure     Insurance plans vary and the physician(s) referred by the ER may not be covered by your plan.  Please contac tell this physician (or your personal doctor if your instructions are to return to your personal doctor) about any new or lasting problems. The primary care or specialist physician will see patients referred from the [facility] Emergency Department.  Adelina Kennedy

## (undated) NOTE — LETTER
[facility]  Carmen Kenny [address] [facility], [address] [place] [address]    Consent for Operation    Date: __________________    Time: _______________    1.  I authorize the performance upon Delayne Nones the following operation:    Procedure(s):  CERVICAL 3 - CERVIC procedure has been videotaped, the surgeon will obtain the original videotape. The hospital will not be responsible for storage or maintenance of this tape.     6. For the purpose of advancing medical education, I consent to the admittance of observers to t STATEMENTS REQUIRING INSERTION OR COMPLETION WERE FILLED IN.     Signature of Patient:   ___________________________    When the patient is a minor or mentally incompetent to give consent:  Signature of person authorized to consent for patient: ____________ drugs/illegal medications). Failure to inform my anesthesiologist about these medicines may increase my risk of anesthetic complications. · If I am allergic to anything or have had a reaction to anesthesia before.     3. I understand how the anesthesia med I have discussed the procedure and information above with the patient (or patient’s representative) and answered their questions. The patient or their representative has agreed to have anesthesia services.     _______________________________________________